# Patient Record
Sex: FEMALE | Race: WHITE | NOT HISPANIC OR LATINO | Employment: STUDENT | URBAN - METROPOLITAN AREA
[De-identification: names, ages, dates, MRNs, and addresses within clinical notes are randomized per-mention and may not be internally consistent; named-entity substitution may affect disease eponyms.]

---

## 2023-11-19 PROBLEM — N39.0 UTI (URINARY TRACT INFECTION): Status: ACTIVE | Noted: 2023-11-19

## 2023-11-19 PROBLEM — R10.2 PELVIC PAIN IN FEMALE: Status: ACTIVE | Noted: 2023-11-19

## 2023-11-20 ENCOUNTER — PROCEDURE VISIT (OUTPATIENT)
Dept: OBSTETRICS AND GYNECOLOGY | Facility: CLINIC | Age: 23
End: 2023-11-20
Payer: COMMERCIAL

## 2023-11-20 VITALS
DIASTOLIC BLOOD PRESSURE: 64 MMHG | SYSTOLIC BLOOD PRESSURE: 110 MMHG | BODY MASS INDEX: 29.99 KG/M2 | WEIGHT: 180 LBS | HEIGHT: 65 IN

## 2023-11-20 DIAGNOSIS — Z30.46 ENCOUNTER FOR NEXPLANON REMOVAL: ICD-10-CM

## 2023-11-20 PROCEDURE — 11982 REMOVE DRUG IMPLANT DEVICE: CPT | Performed by: OBSTETRICS & GYNECOLOGY

## 2023-11-20 NOTE — PROGRESS NOTES
Nexplanon Procedure: Nexplanon removal  Does not like amenorrhea associated with nexplanon. Will use condoms for now.  The implant was located by ultrasound guidance and a 2-3 mm incision was made in the longitudinal direction of the arm at the tip of the implant closest to the elbow. The implant was then grasped and removed with forceps.  Good hemostasis  Pt tolerated well    Enc condoms

## 2024-01-24 ENCOUNTER — TRANSCRIBE ORDERS (OUTPATIENT)
Dept: RADIOLOGY | Facility: CLINIC | Age: 24
End: 2024-01-24
Payer: COMMERCIAL

## 2024-01-24 DIAGNOSIS — N91.2 AMENORRHEA: ICD-10-CM

## 2024-02-19 ENCOUNTER — APPOINTMENT (OUTPATIENT)
Dept: RADIOLOGY | Facility: CLINIC | Age: 24
End: 2024-02-19
Payer: COMMERCIAL

## 2024-02-19 ENCOUNTER — APPOINTMENT (OUTPATIENT)
Dept: OBSTETRICS AND GYNECOLOGY | Facility: CLINIC | Age: 24
End: 2024-02-19
Payer: COMMERCIAL

## 2024-02-29 ENCOUNTER — HOSPITAL ENCOUNTER (OUTPATIENT)
Dept: RADIOLOGY | Facility: CLINIC | Age: 24
Discharge: HOME | End: 2024-02-29
Payer: COMMERCIAL

## 2024-02-29 ENCOUNTER — INITIAL PRENATAL (OUTPATIENT)
Dept: OBSTETRICS AND GYNECOLOGY | Facility: CLINIC | Age: 24
End: 2024-02-29
Payer: COMMERCIAL

## 2024-02-29 VITALS — BODY MASS INDEX: 31.78 KG/M2 | WEIGHT: 191 LBS | DIASTOLIC BLOOD PRESSURE: 70 MMHG | SYSTOLIC BLOOD PRESSURE: 118 MMHG

## 2024-02-29 DIAGNOSIS — N91.2 AMENORRHEA: ICD-10-CM

## 2024-02-29 DIAGNOSIS — Z34.01: ICD-10-CM

## 2024-02-29 DIAGNOSIS — Z3A.00 WEEKS OF GESTATION OF PREGNANCY NOT SPECIFIED (HHS-HCC): ICD-10-CM

## 2024-02-29 LAB
ERYTHROCYTE [DISTWIDTH] IN BLOOD BY AUTOMATED COUNT: 11.8 % (ref 11.5–14.5)
HBV SURFACE AG SERPL QL IA: NONREACTIVE
HCT VFR BLD AUTO: 39.8 % (ref 36–46)
HCV AB SER QL: NONREACTIVE
HGB BLD-MCNC: 13.7 G/DL (ref 12–16)
HIV 1+2 AB+HIV1 P24 AG SERPL QL IA: NONREACTIVE
MCH RBC QN AUTO: 29.1 PG (ref 26–34)
MCHC RBC AUTO-ENTMCNC: 34.4 G/DL (ref 32–36)
MCV RBC AUTO: 85 FL (ref 80–100)
NRBC BLD-RTO: 0 /100 WBCS (ref 0–0)
PLATELET # BLD AUTO: 264 X10*3/UL (ref 150–450)
PREGNANCY TEST URINE, POC: POSITIVE
RBC # BLD AUTO: 4.71 X10*6/UL (ref 4–5.2)
TREPONEMA PALLIDUM IGG+IGM AB [PRESENCE] IN SERUM OR PLASMA BY IMMUNOASSAY: NONREACTIVE
WBC # BLD AUTO: 6.2 X10*3/UL (ref 4.4–11.3)

## 2024-02-29 PROCEDURE — 87624 HPV HI-RISK TYP POOLED RSLT: CPT

## 2024-02-29 PROCEDURE — 0500F INITIAL PRENATAL CARE VISIT: CPT | Performed by: OBSTETRICS & GYNECOLOGY

## 2024-02-29 PROCEDURE — 76801 OB US < 14 WKS SINGLE FETUS: CPT | Performed by: OBSTETRICS & GYNECOLOGY

## 2024-02-29 PROCEDURE — 81025 URINE PREGNANCY TEST: CPT | Performed by: OBSTETRICS & GYNECOLOGY

## 2024-02-29 PROCEDURE — 86901 BLOOD TYPING SEROLOGIC RH(D): CPT

## 2024-02-29 PROCEDURE — 36415 COLL VENOUS BLD VENIPUNCTURE: CPT

## 2024-02-29 PROCEDURE — 86900 BLOOD TYPING SEROLOGIC ABO: CPT

## 2024-02-29 PROCEDURE — 86317 IMMUNOASSAY INFECTIOUS AGENT: CPT

## 2024-02-29 PROCEDURE — 86850 RBC ANTIBODY SCREEN: CPT

## 2024-02-29 PROCEDURE — 87389 HIV-1 AG W/HIV-1&-2 AB AG IA: CPT

## 2024-02-29 PROCEDURE — 87800 DETECT AGNT MULT DNA DIREC: CPT

## 2024-02-29 PROCEDURE — 85027 COMPLETE CBC AUTOMATED: CPT

## 2024-02-29 PROCEDURE — 87086 URINE CULTURE/COLONY COUNT: CPT

## 2024-02-29 PROCEDURE — 88175 CYTOPATH C/V AUTO FLUID REDO: CPT

## 2024-02-29 PROCEDURE — 87340 HEPATITIS B SURFACE AG IA: CPT

## 2024-02-29 PROCEDURE — 86803 HEPATITIS C AB TEST: CPT

## 2024-02-29 PROCEDURE — 88141 CYTOPATH C/V INTERPRET: CPT | Performed by: PATHOLOGY

## 2024-02-29 PROCEDURE — 86780 TREPONEMA PALLIDUM: CPT

## 2024-02-29 NOTE — PROGRESS NOTES
Subjective   Patient ID [id number]   Emy Renae is a 23 y.o.  at Unknown with a working estimated date of delivery of Not found. who presents for an initial prenatal visit. This pregnancy is unplanned.    Her pregnancy is complicated by:  obesity    OB History    Para Term  AB Living   2 0 0 0 1     SAB IAB Ectopic Multiple Live Births   0 1 0          # Outcome Date GA Lbr Wade/2nd Weight Sex Delivery Anes PTL Lv   2 Current            1 IAB                   Objective   Physical Exam  Weight: 86.6 kg (191 lb)  Expected Total Weight Gain: Could not be calculated   Pregravid BMI: Could not be calculated  BP: 118/70          OBGyn Exam    Prenatal Labs  ordered    Assessment/Plan   Immunizations:   Prenatal Labs ordered  Daily prenatal vitamins prescribed  First trimester screening and second trimester screening discussed. Patient decided to order NIPT today  Follow up in 4 weeks for return OB visit.

## 2024-03-01 LAB
ABO GROUP (TYPE) IN BLOOD: NORMAL
ANTIBODY SCREEN: NORMAL
BACTERIA UR CULT: NORMAL
C TRACH RRNA SPEC QL NAA+PROBE: NEGATIVE
N GONORRHOEA DNA SPEC QL PROBE+SIG AMP: NEGATIVE
RH FACTOR (ANTIGEN D): NORMAL
RUBV IGG SERPL IA-ACNC: 2.5 IA
RUBV IGG SERPL QL IA: POSITIVE

## 2024-03-18 LAB
CYTOLOGY CMNT CVX/VAG CYTO-IMP: NORMAL
HPV HR 12 DNA GENITAL QL NAA+PROBE: POSITIVE
HPV HR GENOTYPES PNL CVX NAA+PROBE: POSITIVE
HPV16 DNA SPEC QL NAA+PROBE: NEGATIVE
HPV18 DNA SPEC QL NAA+PROBE: NEGATIVE
LAB AP HPV GENOTYPE QUESTION: YES
LAB AP HPV HR: NORMAL
LABORATORY COMMENT REPORT: NORMAL
LMP START DATE: NORMAL
PATH REPORT.TOTAL CANCER: NORMAL

## 2024-04-02 ENCOUNTER — ROUTINE PRENATAL (OUTPATIENT)
Dept: OBSTETRICS AND GYNECOLOGY | Facility: CLINIC | Age: 24
End: 2024-04-02
Payer: COMMERCIAL

## 2024-04-02 VITALS — DIASTOLIC BLOOD PRESSURE: 74 MMHG | BODY MASS INDEX: 32.45 KG/M2 | WEIGHT: 195 LBS | SYSTOLIC BLOOD PRESSURE: 120 MMHG

## 2024-04-02 DIAGNOSIS — Z34.02 ENCOUNTER FOR SUPERVISION OF NORMAL FIRST PREGNANCY IN SECOND TRIMESTER (HHS-HCC): ICD-10-CM

## 2024-04-02 PROCEDURE — 0501F PRENATAL FLOW SHEET: CPT | Performed by: OBSTETRICS & GYNECOLOGY

## 2024-04-29 ENCOUNTER — HOSPITAL ENCOUNTER (OUTPATIENT)
Dept: RADIOLOGY | Facility: CLINIC | Age: 24
Discharge: HOME | End: 2024-04-29
Payer: COMMERCIAL

## 2024-04-29 ENCOUNTER — ROUTINE PRENATAL (OUTPATIENT)
Dept: OBSTETRICS AND GYNECOLOGY | Facility: CLINIC | Age: 24
End: 2024-04-29
Payer: COMMERCIAL

## 2024-04-29 VITALS — DIASTOLIC BLOOD PRESSURE: 70 MMHG | BODY MASS INDEX: 32.78 KG/M2 | WEIGHT: 197 LBS | SYSTOLIC BLOOD PRESSURE: 126 MMHG

## 2024-04-29 DIAGNOSIS — Z34.02 ENCOUNTER FOR SUPERVISION OF NORMAL FIRST PREGNANCY IN SECOND TRIMESTER (HHS-HCC): ICD-10-CM

## 2024-04-29 PROCEDURE — 76805 OB US >/= 14 WKS SNGL FETUS: CPT | Performed by: OBSTETRICS & GYNECOLOGY

## 2024-04-29 PROCEDURE — 0501F PRENATAL FLOW SHEET: CPT | Performed by: OBSTETRICS & GYNECOLOGY

## 2024-04-29 NOTE — PROGRESS NOTES
Anatomy scan normal  Patient complains of episodes of fast heart rate which resolves spontaneously  Has occurred for the last couple days  Sometimes feels lightheaded  Will continue to monitor for the next week or 2 if persistent will have patient follow-up with cardiology

## 2024-05-30 ENCOUNTER — APPOINTMENT (OUTPATIENT)
Dept: OBSTETRICS AND GYNECOLOGY | Facility: CLINIC | Age: 24
End: 2024-05-30
Payer: COMMERCIAL

## 2024-06-06 ENCOUNTER — ROUTINE PRENATAL (OUTPATIENT)
Dept: OBSTETRICS AND GYNECOLOGY | Facility: CLINIC | Age: 24
End: 2024-06-06
Payer: COMMERCIAL

## 2024-06-06 VITALS — BODY MASS INDEX: 32.78 KG/M2 | WEIGHT: 197 LBS | SYSTOLIC BLOOD PRESSURE: 128 MMHG | DIASTOLIC BLOOD PRESSURE: 72 MMHG

## 2024-06-06 DIAGNOSIS — O26.12 LOW WEIGHT GAIN DURING PREGNANCY IN SECOND TRIMESTER (HHS-HCC): Primary | ICD-10-CM

## 2024-06-06 DIAGNOSIS — Z34.02 ENCOUNTER FOR SUPERVISION OF NORMAL FIRST PREGNANCY IN SECOND TRIMESTER (HHS-HCC): ICD-10-CM

## 2024-06-06 PROCEDURE — 0501F PRENATAL FLOW SHEET: CPT | Performed by: OBSTETRICS & GYNECOLOGY

## 2024-07-05 ENCOUNTER — APPOINTMENT (OUTPATIENT)
Dept: OBSTETRICS AND GYNECOLOGY | Facility: CLINIC | Age: 24
End: 2024-07-05
Payer: COMMERCIAL

## 2024-07-07 RX ORDER — ALBUTEROL SULFATE 90 UG/1
2 AEROSOL, METERED RESPIRATORY (INHALATION) EVERY 6 HOURS PRN
COMMUNITY
Start: 2022-10-21

## 2024-07-08 ENCOUNTER — APPOINTMENT (OUTPATIENT)
Dept: RADIOLOGY | Facility: CLINIC | Age: 24
End: 2024-07-08
Payer: COMMERCIAL

## 2024-07-08 ENCOUNTER — APPOINTMENT (OUTPATIENT)
Dept: OBSTETRICS AND GYNECOLOGY | Facility: CLINIC | Age: 24
End: 2024-07-08
Payer: COMMERCIAL

## 2024-07-08 VITALS — SYSTOLIC BLOOD PRESSURE: 122 MMHG | DIASTOLIC BLOOD PRESSURE: 76 MMHG | WEIGHT: 200 LBS | BODY MASS INDEX: 33.28 KG/M2

## 2024-07-08 DIAGNOSIS — O26.12 LOW WEIGHT GAIN DURING PREGNANCY IN SECOND TRIMESTER (HHS-HCC): ICD-10-CM

## 2024-07-08 DIAGNOSIS — Z34.02 ENCOUNTER FOR SUPERVISION OF NORMAL FIRST PREGNANCY IN SECOND TRIMESTER (HHS-HCC): ICD-10-CM

## 2024-07-08 DIAGNOSIS — Z34.03 FIRST PREGNANCY, THIRD TRIMESTER (HHS-HCC): ICD-10-CM

## 2024-07-08 LAB
GLUCOSE 1H P 50 G GLC PO SERPL-MCNC: 124 MG/DL
HCT VFR BLD AUTO: 37.2 % (ref 36–46)
HGB BLD-MCNC: 12.6 G/DL (ref 12–16)

## 2024-07-08 PROCEDURE — 0501F PRENATAL FLOW SHEET: CPT | Performed by: OBSTETRICS & GYNECOLOGY

## 2024-07-08 PROCEDURE — 85014 HEMATOCRIT: CPT

## 2024-07-08 PROCEDURE — 36415 COLL VENOUS BLD VENIPUNCTURE: CPT

## 2024-07-08 PROCEDURE — 76815 OB US LIMITED FETUS(S): CPT | Performed by: OBSTETRICS & GYNECOLOGY

## 2024-07-08 PROCEDURE — 85018 HEMOGLOBIN: CPT

## 2024-07-08 PROCEDURE — 82947 ASSAY GLUCOSE BLOOD QUANT: CPT

## 2024-07-30 ENCOUNTER — APPOINTMENT (OUTPATIENT)
Dept: OBSTETRICS AND GYNECOLOGY | Facility: CLINIC | Age: 24
End: 2024-07-30
Payer: COMMERCIAL

## 2024-07-30 VITALS — SYSTOLIC BLOOD PRESSURE: 126 MMHG | WEIGHT: 205 LBS | DIASTOLIC BLOOD PRESSURE: 76 MMHG | BODY MASS INDEX: 34.11 KG/M2

## 2024-07-30 DIAGNOSIS — Z34.03 FIRST PREGNANCY, THIRD TRIMESTER (HHS-HCC): ICD-10-CM

## 2024-07-30 PROCEDURE — 90471 IMMUNIZATION ADMIN: CPT | Performed by: OBSTETRICS & GYNECOLOGY

## 2024-07-30 PROCEDURE — 0501F PRENATAL FLOW SHEET: CPT | Performed by: OBSTETRICS & GYNECOLOGY

## 2024-07-30 PROCEDURE — 90715 TDAP VACCINE 7 YRS/> IM: CPT | Performed by: OBSTETRICS & GYNECOLOGY

## 2024-08-13 ENCOUNTER — APPOINTMENT (OUTPATIENT)
Dept: OBSTETRICS AND GYNECOLOGY | Facility: CLINIC | Age: 24
End: 2024-08-13
Payer: COMMERCIAL

## 2024-08-15 ENCOUNTER — ROUTINE PRENATAL (OUTPATIENT)
Dept: OBSTETRICS AND GYNECOLOGY | Facility: CLINIC | Age: 24
End: 2024-08-15
Payer: COMMERCIAL

## 2024-08-15 VITALS — BODY MASS INDEX: 35.94 KG/M2 | SYSTOLIC BLOOD PRESSURE: 130 MMHG | WEIGHT: 216 LBS | DIASTOLIC BLOOD PRESSURE: 80 MMHG

## 2024-08-15 DIAGNOSIS — Z34.03 FIRST PREGNANCY, THIRD TRIMESTER (HHS-HCC): Primary | ICD-10-CM

## 2024-08-15 DIAGNOSIS — O99.213 OBESITY AFFECTING PREGNANCY IN THIRD TRIMESTER, UNSPECIFIED OBESITY TYPE (HHS-HCC): ICD-10-CM

## 2024-08-15 PROCEDURE — 0501F PRENATAL FLOW SHEET: CPT | Performed by: OBSTETRICS & GYNECOLOGY

## 2024-08-15 NOTE — PROGRESS NOTES
11# weight gain in 2 weeks  No significant swelling  BP slightly higher but still normal range  Precautions given  Follow up one week with OB US

## 2024-08-22 ENCOUNTER — APPOINTMENT (OUTPATIENT)
Dept: RADIOLOGY | Facility: CLINIC | Age: 24
End: 2024-08-22
Payer: COMMERCIAL

## 2024-08-22 ENCOUNTER — APPOINTMENT (OUTPATIENT)
Dept: OBSTETRICS AND GYNECOLOGY | Facility: CLINIC | Age: 24
End: 2024-08-22
Payer: COMMERCIAL

## 2024-08-22 VITALS — DIASTOLIC BLOOD PRESSURE: 84 MMHG | SYSTOLIC BLOOD PRESSURE: 132 MMHG | BODY MASS INDEX: 36.11 KG/M2 | WEIGHT: 217 LBS

## 2024-08-22 DIAGNOSIS — O99.213 OBESITY AFFECTING PREGNANCY IN THIRD TRIMESTER, UNSPECIFIED OBESITY TYPE (HHS-HCC): ICD-10-CM

## 2024-08-22 DIAGNOSIS — Z34.03 FIRST PREGNANCY, THIRD TRIMESTER (HHS-HCC): ICD-10-CM

## 2024-08-22 DIAGNOSIS — Z34.03 PREGNANCY, FIRST, THIRD TRIMESTER (HHS-HCC): ICD-10-CM

## 2024-08-22 PROCEDURE — 87081 CULTURE SCREEN ONLY: CPT

## 2024-08-22 PROCEDURE — 0501F PRENATAL FLOW SHEET: CPT | Performed by: OBSTETRICS & GYNECOLOGY

## 2024-08-24 LAB — GP B STREP GENITAL QL CULT: NORMAL

## 2024-08-29 ENCOUNTER — APPOINTMENT (OUTPATIENT)
Dept: OBSTETRICS AND GYNECOLOGY | Facility: CLINIC | Age: 24
End: 2024-08-29
Payer: COMMERCIAL

## 2024-08-29 VITALS — WEIGHT: 219 LBS | SYSTOLIC BLOOD PRESSURE: 128 MMHG | DIASTOLIC BLOOD PRESSURE: 84 MMHG | BODY MASS INDEX: 36.44 KG/M2

## 2024-08-29 DIAGNOSIS — Z34.03 PREGNANCY, FIRST, THIRD TRIMESTER (HHS-HCC): ICD-10-CM

## 2024-08-29 PROCEDURE — 0501F PRENATAL FLOW SHEET: CPT | Performed by: OBSTETRICS & GYNECOLOGY

## 2024-09-04 ENCOUNTER — TELEPHONE (OUTPATIENT)
Dept: OBSTETRICS AND GYNECOLOGY | Facility: CLINIC | Age: 24
End: 2024-09-04

## 2024-09-04 ENCOUNTER — ANESTHESIA (OUTPATIENT)
Dept: OBSTETRICS AND GYNECOLOGY | Facility: HOSPITAL | Age: 24
End: 2024-09-04
Payer: COMMERCIAL

## 2024-09-04 ENCOUNTER — HOSPITAL ENCOUNTER (INPATIENT)
Facility: HOSPITAL | Age: 24
LOS: 3 days | Discharge: HOME | End: 2024-09-07
Attending: OBSTETRICS & GYNECOLOGY | Admitting: OBSTETRICS & GYNECOLOGY
Payer: COMMERCIAL

## 2024-09-04 ENCOUNTER — ANESTHESIA EVENT (OUTPATIENT)
Dept: OBSTETRICS AND GYNECOLOGY | Facility: HOSPITAL | Age: 24
End: 2024-09-04
Payer: COMMERCIAL

## 2024-09-04 DIAGNOSIS — O13.9 GESTATIONAL HYPERTENSION, ANTEPARTUM (HHS-HCC): ICD-10-CM

## 2024-09-04 DIAGNOSIS — Z98.891 STATUS POST CESAREAN SECTION: Primary | ICD-10-CM

## 2024-09-04 PROBLEM — O42.90 LEAKAGE OF AMNIOTIC FLUID (HHS-HCC): Status: ACTIVE | Noted: 2024-09-04

## 2024-09-04 PROBLEM — Z3A.37 PREGNANCY WITH 37 WEEKS COMPLETED GESTATION (HHS-HCC): Status: ACTIVE | Noted: 2024-09-04

## 2024-09-04 LAB
ABO GROUP (TYPE) IN BLOOD: NORMAL
ALBUMIN SERPL BCP-MCNC: 3 G/DL (ref 3.4–5)
ALP SERPL-CCNC: 104 U/L (ref 33–110)
ALT SERPL W P-5'-P-CCNC: 9 U/L (ref 7–45)
ANION GAP SERPL CALC-SCNC: 13 MMOL/L (ref 10–20)
ANTIBODY SCREEN: NORMAL
AST SERPL W P-5'-P-CCNC: 11 U/L (ref 9–39)
BASE EXCESS BLDCOV CALC-SCNC: -5.9 MMOL/L (ref -8.1–-0.5)
BILIRUB SERPL-MCNC: 0.3 MG/DL (ref 0–1.2)
BODY TEMPERATURE: 37 DEGREES CELSIUS
BUN SERPL-MCNC: 8 MG/DL (ref 6–23)
CALCIUM SERPL-MCNC: 8.1 MG/DL (ref 8.6–10.3)
CHLORIDE SERPL-SCNC: 107 MMOL/L (ref 98–107)
CO2 SERPL-SCNC: 20 MMOL/L (ref 21–32)
CREAT SERPL-MCNC: 0.61 MG/DL (ref 0.5–1.05)
EGFRCR SERPLBLD CKD-EPI 2021: >90 ML/MIN/1.73M*2
ERYTHROCYTE [DISTWIDTH] IN BLOOD BY AUTOMATED COUNT: 13 % (ref 11.5–14.5)
GLUCOSE SERPL-MCNC: 67 MG/DL (ref 74–99)
HCO3 BLDCOV-SCNC: 20.2 MMOL/L (ref 16–26)
HCT VFR BLD AUTO: 35.8 % (ref 36–46)
HGB BLD-MCNC: 12.3 G/DL (ref 12–16)
INHALED O2 CONCENTRATION: 21 %
LDH SERPL L TO P-CCNC: 155 U/L (ref 84–246)
MCH RBC QN AUTO: 29.3 PG (ref 26–34)
MCHC RBC AUTO-ENTMCNC: 34.4 G/DL (ref 32–36)
MCV RBC AUTO: 85 FL (ref 80–100)
NRBC BLD-RTO: 0 /100 WBCS (ref 0–0)
OXYHGB MFR BLDCOV: 55.8 % (ref 94–98)
PCO2 BLDCOV: 41 MM HG (ref 22–53)
PH BLDCOV: 7.3 PH (ref 7.19–7.47)
PLATELET # BLD AUTO: 170 X10*3/UL (ref 150–450)
PO2 BLDCOV: 29 MM HG (ref 13–37)
POTASSIUM SERPL-SCNC: 4 MMOL/L (ref 3.5–5.3)
PROT SERPL-MCNC: 5.3 G/DL (ref 6.4–8.2)
RBC # BLD AUTO: 4.2 X10*6/UL (ref 4–5.2)
RH FACTOR (ANTIGEN D): NORMAL
SAO2 % BLDCOV: 57 % (ref 16–84)
SODIUM SERPL-SCNC: 136 MMOL/L (ref 136–145)
TEST COMMENT: ABNORMAL
TREPONEMA PALLIDUM IGG+IGM AB [PRESENCE] IN SERUM OR PLASMA BY IMMUNOASSAY: NONREACTIVE
WBC # BLD AUTO: 12.4 X10*3/UL (ref 4.4–11.3)

## 2024-09-04 PROCEDURE — 7100000016 HC LABOR RECOVERY PER HOUR

## 2024-09-04 PROCEDURE — 83615 LACTATE (LD) (LDH) ENZYME: CPT | Performed by: OBSTETRICS & GYNECOLOGY

## 2024-09-04 PROCEDURE — 3700000014 HC AN EPIDURAL BLOCK CHARGE: Performed by: OBSTETRICS & GYNECOLOGY

## 2024-09-04 PROCEDURE — 7210000002 HC LABOR PER HOUR

## 2024-09-04 PROCEDURE — 2500000004 HC RX 250 GENERAL PHARMACY W/ HCPCS (ALT 636 FOR OP/ED): Mod: JZ | Performed by: OBSTETRICS & GYNECOLOGY

## 2024-09-04 PROCEDURE — 59050 FETAL MONITOR W/REPORT: CPT

## 2024-09-04 PROCEDURE — 2500000004 HC RX 250 GENERAL PHARMACY W/ HCPCS (ALT 636 FOR OP/ED): Performed by: ADVANCED PRACTICE MIDWIFE

## 2024-09-04 PROCEDURE — 86780 TREPONEMA PALLIDUM: CPT | Mod: AHULAB | Performed by: OBSTETRICS & GYNECOLOGY

## 2024-09-04 PROCEDURE — 82805 BLOOD GASES W/O2 SATURATION: CPT | Performed by: OBSTETRICS & GYNECOLOGY

## 2024-09-04 PROCEDURE — 88307 TISSUE EXAM BY PATHOLOGIST: CPT | Mod: TC,AHULAB | Performed by: OBSTETRICS & GYNECOLOGY

## 2024-09-04 PROCEDURE — 59515 CESAREAN DELIVERY: CPT | Performed by: OBSTETRICS & GYNECOLOGY

## 2024-09-04 PROCEDURE — 36415 COLL VENOUS BLD VENIPUNCTURE: CPT | Performed by: OBSTETRICS & GYNECOLOGY

## 2024-09-04 PROCEDURE — 88307 TISSUE EXAM BY PATHOLOGIST: CPT | Performed by: PATHOLOGY

## 2024-09-04 PROCEDURE — 10H07YZ INSERTION OF OTHER DEVICE INTO PRODUCTS OF CONCEPTION, VIA NATURAL OR ARTIFICIAL OPENING: ICD-10-PCS | Performed by: OBSTETRICS & GYNECOLOGY

## 2024-09-04 PROCEDURE — 2500000004 HC RX 250 GENERAL PHARMACY W/ HCPCS (ALT 636 FOR OP/ED): Performed by: OBSTETRICS & GYNECOLOGY

## 2024-09-04 PROCEDURE — 59514 CESAREAN DELIVERY ONLY: CPT | Performed by: OBSTETRICS & GYNECOLOGY

## 2024-09-04 PROCEDURE — 1120000001 HC OB PRIVATE ROOM DAILY

## 2024-09-04 PROCEDURE — 86901 BLOOD TYPING SEROLOGIC RH(D): CPT | Performed by: OBSTETRICS & GYNECOLOGY

## 2024-09-04 PROCEDURE — 2500000004 HC RX 250 GENERAL PHARMACY W/ HCPCS (ALT 636 FOR OP/ED): Performed by: NURSE ANESTHETIST, CERTIFIED REGISTERED

## 2024-09-04 PROCEDURE — 2720000007 HC OR 272 NO HCPCS: Performed by: OBSTETRICS & GYNECOLOGY

## 2024-09-04 PROCEDURE — 80053 COMPREHEN METABOLIC PANEL: CPT | Performed by: OBSTETRICS & GYNECOLOGY

## 2024-09-04 PROCEDURE — 2500000001 HC RX 250 WO HCPCS SELF ADMINISTERED DRUGS (ALT 637 FOR MEDICARE OP): Performed by: NURSE ANESTHETIST, CERTIFIED REGISTERED

## 2024-09-04 PROCEDURE — 85027 COMPLETE CBC AUTOMATED: CPT | Performed by: OBSTETRICS & GYNECOLOGY

## 2024-09-04 PROCEDURE — A6213 FOAM DRG >16<=48 SQ IN W/BDR: HCPCS | Performed by: OBSTETRICS & GYNECOLOGY

## 2024-09-04 PROCEDURE — 7100000016 HC LABOR RECOVERY PER HOUR: Performed by: OBSTETRICS & GYNECOLOGY

## 2024-09-04 RX ORDER — METOCLOPRAMIDE HYDROCHLORIDE 5 MG/ML
10 INJECTION INTRAMUSCULAR; INTRAVENOUS ONCE
Status: DISCONTINUED | OUTPATIENT
Start: 2024-09-04 | End: 2024-09-04

## 2024-09-04 RX ORDER — ONDANSETRON 4 MG/1
4 TABLET, FILM COATED ORAL EVERY 6 HOURS PRN
Status: DISCONTINUED | OUTPATIENT
Start: 2024-09-04 | End: 2024-09-04

## 2024-09-04 RX ORDER — METOCLOPRAMIDE HYDROCHLORIDE 5 MG/ML
10 INJECTION INTRAMUSCULAR; INTRAVENOUS EVERY 6 HOURS PRN
Status: DISCONTINUED | OUTPATIENT
Start: 2024-09-04 | End: 2024-09-04

## 2024-09-04 RX ORDER — METHYLERGONOVINE MALEATE 0.2 MG/ML
0.2 INJECTION INTRAVENOUS ONCE AS NEEDED
Status: DISCONTINUED | OUTPATIENT
Start: 2024-09-04 | End: 2024-09-04

## 2024-09-04 RX ORDER — MISOPROSTOL 200 UG/1
800 TABLET ORAL ONCE AS NEEDED
Status: DISCONTINUED | OUTPATIENT
Start: 2024-09-04 | End: 2024-09-07 | Stop reason: HOSPADM

## 2024-09-04 RX ORDER — SODIUM CITRATE AND CITRIC ACID MONOHYDRATE 334; 500 MG/5ML; MG/5ML
30 SOLUTION ORAL ONCE
Status: DISCONTINUED | OUTPATIENT
Start: 2024-09-04 | End: 2024-09-04

## 2024-09-04 RX ORDER — DIPHENHYDRAMINE HCL 25 MG
25 CAPSULE ORAL EVERY 4 HOURS PRN
Status: DISCONTINUED | OUTPATIENT
Start: 2024-09-04 | End: 2024-09-07 | Stop reason: HOSPADM

## 2024-09-04 RX ORDER — PHENYLEPHRINE 10 MG/250 ML(40 MCG/ML)IN 0.9 % SOD.CHLORIDE INTRAVENOUS
CONTINUOUS PRN
Status: DISCONTINUED | OUTPATIENT
Start: 2024-09-04 | End: 2024-09-04

## 2024-09-04 RX ORDER — ONDANSETRON HYDROCHLORIDE 2 MG/ML
4 INJECTION, SOLUTION INTRAVENOUS EVERY 6 HOURS PRN
Status: DISCONTINUED | OUTPATIENT
Start: 2024-09-04 | End: 2024-09-07 | Stop reason: HOSPADM

## 2024-09-04 RX ORDER — SODIUM CHLORIDE, SODIUM LACTATE, POTASSIUM CHLORIDE, CALCIUM CHLORIDE 600; 310; 30; 20 MG/100ML; MG/100ML; MG/100ML; MG/100ML
125 INJECTION, SOLUTION INTRAVENOUS CONTINUOUS
Status: DISCONTINUED | OUTPATIENT
Start: 2024-09-04 | End: 2024-09-04

## 2024-09-04 RX ORDER — LOPERAMIDE HYDROCHLORIDE 2 MG/1
4 CAPSULE ORAL EVERY 2 HOUR PRN
Status: DISCONTINUED | OUTPATIENT
Start: 2024-09-04 | End: 2024-09-04

## 2024-09-04 RX ORDER — OXYCODONE HYDROCHLORIDE 5 MG/1
10 TABLET ORAL EVERY 4 HOURS PRN
Status: DISCONTINUED | OUTPATIENT
Start: 2024-09-05 | End: 2024-09-07 | Stop reason: HOSPADM

## 2024-09-04 RX ORDER — HYDRALAZINE HYDROCHLORIDE 20 MG/ML
5 INJECTION INTRAMUSCULAR; INTRAVENOUS ONCE AS NEEDED
Status: DISCONTINUED | OUTPATIENT
Start: 2024-09-04 | End: 2024-09-04

## 2024-09-04 RX ORDER — TRANEXAMIC ACID 10 MG/ML
1000 INJECTION, SOLUTION INTRAVENOUS ONCE AS NEEDED
Status: DISPENSED | OUTPATIENT
Start: 2024-09-04 | End: 2024-09-06

## 2024-09-04 RX ORDER — SODIUM CHLORIDE, SODIUM LACTATE, POTASSIUM CHLORIDE, CALCIUM CHLORIDE 600; 310; 30; 20 MG/100ML; MG/100ML; MG/100ML; MG/100ML
20 INJECTION, SOLUTION INTRAVENOUS CONTINUOUS
Status: DISCONTINUED | OUTPATIENT
Start: 2024-09-04 | End: 2024-09-07 | Stop reason: HOSPADM

## 2024-09-04 RX ORDER — SCOLOPAMINE TRANSDERMAL SYSTEM 1 MG/1
1 PATCH, EXTENDED RELEASE TRANSDERMAL ONCE AS NEEDED
Status: DISCONTINUED | OUTPATIENT
Start: 2024-09-04 | End: 2024-09-04

## 2024-09-04 RX ORDER — METHYLERGONOVINE MALEATE 0.2 MG/ML
0.2 INJECTION INTRAVENOUS ONCE AS NEEDED
Status: DISCONTINUED | OUTPATIENT
Start: 2024-09-04 | End: 2024-09-07 | Stop reason: HOSPADM

## 2024-09-04 RX ORDER — ONDANSETRON HYDROCHLORIDE 2 MG/ML
INJECTION, SOLUTION INTRAVENOUS AS NEEDED
Status: DISCONTINUED | OUTPATIENT
Start: 2024-09-04 | End: 2024-09-04

## 2024-09-04 RX ORDER — LIDOCAINE HYDROCHLORIDE 10 MG/ML
30 INJECTION INFILTRATION; PERINEURAL ONCE AS NEEDED
Status: DISCONTINUED | OUTPATIENT
Start: 2024-09-04 | End: 2024-09-04

## 2024-09-04 RX ORDER — MISOPROSTOL 200 UG/1
800 TABLET ORAL ONCE AS NEEDED
Status: DISCONTINUED | OUTPATIENT
Start: 2024-09-04 | End: 2024-09-04

## 2024-09-04 RX ORDER — HYDROMORPHONE HYDROCHLORIDE 1 MG/ML
0.2 INJECTION, SOLUTION INTRAMUSCULAR; INTRAVENOUS; SUBCUTANEOUS EVERY 5 MIN PRN
Status: CANCELLED | OUTPATIENT
Start: 2024-09-04

## 2024-09-04 RX ORDER — OXYTOCIN/0.9 % SODIUM CHLORIDE 30/500 ML
60 PLASTIC BAG, INJECTION (ML) INTRAVENOUS ONCE AS NEEDED
Status: DISCONTINUED | OUTPATIENT
Start: 2024-09-04 | End: 2024-09-04

## 2024-09-04 RX ORDER — CARBOPROST TROMETHAMINE 250 UG/ML
250 INJECTION, SOLUTION INTRAMUSCULAR ONCE AS NEEDED
Status: DISCONTINUED | OUTPATIENT
Start: 2024-09-04 | End: 2024-09-07 | Stop reason: HOSPADM

## 2024-09-04 RX ORDER — OXYCODONE HYDROCHLORIDE 5 MG/1
5 TABLET ORAL EVERY 4 HOURS PRN
Status: DISCONTINUED | OUTPATIENT
Start: 2024-09-05 | End: 2024-09-07 | Stop reason: HOSPADM

## 2024-09-04 RX ORDER — IBUPROFEN 600 MG/1
600 TABLET ORAL EVERY 6 HOURS
Status: DISCONTINUED | OUTPATIENT
Start: 2024-09-05 | End: 2024-09-07 | Stop reason: HOSPADM

## 2024-09-04 RX ORDER — DIPHENHYDRAMINE HYDROCHLORIDE 50 MG/ML
25 INJECTION INTRAMUSCULAR; INTRAVENOUS EVERY 4 HOURS PRN
Status: DISCONTINUED | OUTPATIENT
Start: 2024-09-04 | End: 2024-09-07 | Stop reason: HOSPADM

## 2024-09-04 RX ORDER — ACETAMINOPHEN 650 MG/1
SUPPOSITORY RECTAL AS NEEDED
Status: DISCONTINUED | OUTPATIENT
Start: 2024-09-04 | End: 2024-09-04

## 2024-09-04 RX ORDER — MORPHINE SULFATE 0.5 MG/ML
INJECTION, SOLUTION EPIDURAL; INTRATHECAL; INTRAVENOUS AS NEEDED
Status: DISCONTINUED | OUTPATIENT
Start: 2024-09-04 | End: 2024-09-04

## 2024-09-04 RX ORDER — TERBUTALINE SULFATE 1 MG/ML
0.25 INJECTION SUBCUTANEOUS ONCE AS NEEDED
Status: DISCONTINUED | OUTPATIENT
Start: 2024-09-04 | End: 2024-09-04

## 2024-09-04 RX ORDER — TRANEXAMIC ACID 10 MG/ML
1000 INJECTION, SOLUTION INTRAVENOUS ONCE AS NEEDED
Status: DISCONTINUED | OUTPATIENT
Start: 2024-09-04 | End: 2024-09-04

## 2024-09-04 RX ORDER — ONDANSETRON 4 MG/1
4 TABLET, FILM COATED ORAL EVERY 6 HOURS PRN
Status: DISCONTINUED | OUTPATIENT
Start: 2024-09-04 | End: 2024-09-07 | Stop reason: HOSPADM

## 2024-09-04 RX ORDER — ACETAMINOPHEN 325 MG/1
975 TABLET ORAL EVERY 6 HOURS
Status: DISCONTINUED | OUTPATIENT
Start: 2024-09-05 | End: 2024-09-07 | Stop reason: HOSPADM

## 2024-09-04 RX ORDER — OXYTOCIN/0.9 % SODIUM CHLORIDE 30/500 ML
60 PLASTIC BAG, INJECTION (ML) INTRAVENOUS ONCE AS NEEDED
Status: DISCONTINUED | OUTPATIENT
Start: 2024-09-04 | End: 2024-09-07 | Stop reason: HOSPADM

## 2024-09-04 RX ORDER — NIFEDIPINE 10 MG/1
10 CAPSULE ORAL ONCE AS NEEDED
Status: DISCONTINUED | OUTPATIENT
Start: 2024-09-04 | End: 2024-09-07 | Stop reason: HOSPADM

## 2024-09-04 RX ORDER — ADHESIVE BANDAGE
10 BANDAGE TOPICAL
Status: DISCONTINUED | OUTPATIENT
Start: 2024-09-04 | End: 2024-09-07 | Stop reason: HOSPADM

## 2024-09-04 RX ORDER — NALOXONE HYDROCHLORIDE 0.4 MG/ML
0.1 INJECTION, SOLUTION INTRAMUSCULAR; INTRAVENOUS; SUBCUTANEOUS EVERY 5 MIN PRN
Status: DISCONTINUED | OUTPATIENT
Start: 2024-09-04 | End: 2024-09-07 | Stop reason: HOSPADM

## 2024-09-04 RX ORDER — HYDROMORPHONE HYDROCHLORIDE 1 MG/ML
0.2 INJECTION, SOLUTION INTRAMUSCULAR; INTRAVENOUS; SUBCUTANEOUS EVERY 5 MIN PRN
Status: DISCONTINUED | OUTPATIENT
Start: 2024-09-04 | End: 2024-09-07 | Stop reason: HOSPADM

## 2024-09-04 RX ORDER — POLYETHYLENE GLYCOL 3350 17 G/17G
17 POWDER, FOR SOLUTION ORAL 2 TIMES DAILY PRN
Status: DISCONTINUED | OUTPATIENT
Start: 2024-09-04 | End: 2024-09-07 | Stop reason: HOSPADM

## 2024-09-04 RX ORDER — METOCLOPRAMIDE 10 MG/1
10 TABLET ORAL EVERY 6 HOURS PRN
Status: DISCONTINUED | OUTPATIENT
Start: 2024-09-04 | End: 2024-09-04

## 2024-09-04 RX ORDER — LABETALOL HYDROCHLORIDE 5 MG/ML
20 INJECTION, SOLUTION INTRAVENOUS ONCE AS NEEDED
Status: DISCONTINUED | OUTPATIENT
Start: 2024-09-04 | End: 2024-09-04

## 2024-09-04 RX ORDER — HYDRALAZINE HYDROCHLORIDE 20 MG/ML
5 INJECTION INTRAMUSCULAR; INTRAVENOUS ONCE AS NEEDED
Status: DISCONTINUED | OUTPATIENT
Start: 2024-09-04 | End: 2024-09-07 | Stop reason: HOSPADM

## 2024-09-04 RX ORDER — ONDANSETRON HYDROCHLORIDE 2 MG/ML
4 INJECTION, SOLUTION INTRAVENOUS EVERY 6 HOURS PRN
Status: DISCONTINUED | OUTPATIENT
Start: 2024-09-04 | End: 2024-09-04

## 2024-09-04 RX ORDER — NALBUPHINE HYDROCHLORIDE 10 MG/ML
10 INJECTION, SOLUTION INTRAMUSCULAR; INTRAVENOUS; SUBCUTANEOUS ONCE
Status: COMPLETED | OUTPATIENT
Start: 2024-09-04 | End: 2024-09-04

## 2024-09-04 RX ORDER — LOPERAMIDE HYDROCHLORIDE 2 MG/1
4 CAPSULE ORAL EVERY 2 HOUR PRN
Status: DISCONTINUED | OUTPATIENT
Start: 2024-09-04 | End: 2024-09-07 | Stop reason: HOSPADM

## 2024-09-04 RX ORDER — NIFEDIPINE 10 MG/1
10 CAPSULE ORAL ONCE AS NEEDED
Status: DISCONTINUED | OUTPATIENT
Start: 2024-09-04 | End: 2024-09-04

## 2024-09-04 RX ORDER — KETOROLAC TROMETHAMINE 30 MG/ML
INJECTION, SOLUTION INTRAMUSCULAR; INTRAVENOUS AS NEEDED
Status: DISCONTINUED | OUTPATIENT
Start: 2024-09-04 | End: 2024-09-04

## 2024-09-04 RX ORDER — BISACODYL 10 MG/1
10 SUPPOSITORY RECTAL DAILY PRN
Status: DISCONTINUED | OUTPATIENT
Start: 2024-09-04 | End: 2024-09-07 | Stop reason: HOSPADM

## 2024-09-04 RX ORDER — LABETALOL HYDROCHLORIDE 5 MG/ML
20 INJECTION, SOLUTION INTRAVENOUS ONCE AS NEEDED
Status: DISCONTINUED | OUTPATIENT
Start: 2024-09-04 | End: 2024-09-07 | Stop reason: HOSPADM

## 2024-09-04 RX ORDER — CEFAZOLIN SODIUM 2 G/100ML
2 INJECTION, SOLUTION INTRAVENOUS ONCE
Status: COMPLETED | OUTPATIENT
Start: 2024-09-04 | End: 2024-09-04

## 2024-09-04 RX ORDER — AMOXICILLIN 250 MG
2 CAPSULE ORAL NIGHTLY PRN
Status: DISCONTINUED | OUTPATIENT
Start: 2024-09-04 | End: 2024-09-07 | Stop reason: HOSPADM

## 2024-09-04 RX ORDER — ENOXAPARIN SODIUM 100 MG/ML
40 INJECTION SUBCUTANEOUS EVERY 24 HOURS
Status: DISCONTINUED | OUTPATIENT
Start: 2024-09-05 | End: 2024-09-07 | Stop reason: HOSPADM

## 2024-09-04 RX ORDER — OXYTOCIN 10 [USP'U]/ML
10 INJECTION, SOLUTION INTRAMUSCULAR; INTRAVENOUS ONCE AS NEEDED
Status: DISCONTINUED | OUTPATIENT
Start: 2024-09-04 | End: 2024-09-04

## 2024-09-04 RX ORDER — AZITHROMYCIN 100 MG/ML
INJECTION, POWDER, LYOPHILIZED, FOR SOLUTION INTRAVENOUS AS NEEDED
Status: DISCONTINUED | OUTPATIENT
Start: 2024-09-04 | End: 2024-09-04

## 2024-09-04 RX ORDER — NALBUPHINE HYDROCHLORIDE 10 MG/ML
10 INJECTION, SOLUTION INTRAMUSCULAR; INTRAVENOUS; SUBCUTANEOUS
Status: DISCONTINUED | OUTPATIENT
Start: 2024-09-04 | End: 2024-09-04

## 2024-09-04 RX ORDER — KETOROLAC TROMETHAMINE 30 MG/ML
30 INJECTION, SOLUTION INTRAMUSCULAR; INTRAVENOUS EVERY 6 HOURS
Status: COMPLETED | OUTPATIENT
Start: 2024-09-05 | End: 2024-09-05

## 2024-09-04 RX ORDER — SIMETHICONE 80 MG
80 TABLET,CHEWABLE ORAL 4 TIMES DAILY PRN
Status: DISCONTINUED | OUTPATIENT
Start: 2024-09-04 | End: 2024-09-07 | Stop reason: HOSPADM

## 2024-09-04 RX ORDER — LIDOCAINE 560 MG/1
1 PATCH PERCUTANEOUS; TOPICAL; TRANSDERMAL
Status: DISCONTINUED | OUTPATIENT
Start: 2024-09-04 | End: 2024-09-07 | Stop reason: HOSPADM

## 2024-09-04 RX ORDER — OXYTOCIN/0.9 % SODIUM CHLORIDE 30/500 ML
2-30 PLASTIC BAG, INJECTION (ML) INTRAVENOUS CONTINUOUS
Status: DISCONTINUED | OUTPATIENT
Start: 2024-09-04 | End: 2024-09-04

## 2024-09-04 RX ORDER — OXYTOCIN 10 [USP'U]/ML
10 INJECTION, SOLUTION INTRAMUSCULAR; INTRAVENOUS ONCE AS NEEDED
Status: DISCONTINUED | OUTPATIENT
Start: 2024-09-04 | End: 2024-09-07 | Stop reason: HOSPADM

## 2024-09-04 RX ORDER — ALBUTEROL SULFATE 90 UG/1
2 INHALANT RESPIRATORY (INHALATION) EVERY 6 HOURS PRN
Status: DISCONTINUED | OUTPATIENT
Start: 2024-09-04 | End: 2024-09-07 | Stop reason: HOSPADM

## 2024-09-04 RX ORDER — CARBOPROST TROMETHAMINE 250 UG/ML
250 INJECTION, SOLUTION INTRAMUSCULAR ONCE AS NEEDED
Status: DISCONTINUED | OUTPATIENT
Start: 2024-09-04 | End: 2024-09-04

## 2024-09-04 RX ORDER — FAMOTIDINE 10 MG/ML
20 INJECTION INTRAVENOUS ONCE
Status: DISCONTINUED | OUTPATIENT
Start: 2024-09-04 | End: 2024-09-04

## 2024-09-04 RX ORDER — BUPIVACAINE HYDROCHLORIDE 7.5 MG/ML
INJECTION, SOLUTION INTRASPINAL AS NEEDED
Status: DISCONTINUED | OUTPATIENT
Start: 2024-09-04 | End: 2024-09-04

## 2024-09-04 RX ADMIN — KETOROLAC TROMETHAMINE 30 MG: 30 INJECTION, SOLUTION INTRAMUSCULAR; INTRAVENOUS at 23:57

## 2024-09-04 RX ADMIN — ACETAMINOPHEN 975 MG: 325 TABLET ORAL at 23:57

## 2024-09-04 RX ADMIN — SODIUM CHLORIDE, POTASSIUM CHLORIDE, SODIUM LACTATE AND CALCIUM CHLORIDE 125 ML/HR: 600; 310; 30; 20 INJECTION, SOLUTION INTRAVENOUS at 13:28

## 2024-09-04 RX ADMIN — ONDANSETRON 4 MG: 2 INJECTION INTRAMUSCULAR; INTRAVENOUS at 20:32

## 2024-09-04 RX ADMIN — NALBUPHINE HYDROCHLORIDE 10 MG: 10 INJECTION, SOLUTION INTRAMUSCULAR; INTRAVENOUS; SUBCUTANEOUS at 13:27

## 2024-09-04 RX ADMIN — SCOPALAMINE 1 PATCH: 1 PATCH, EXTENDED RELEASE TRANSDERMAL at 20:28

## 2024-09-04 SDOH — HEALTH STABILITY: MENTAL HEALTH: WISH TO BE DEAD (PAST 1 MONTH): NO

## 2024-09-04 SDOH — ECONOMIC STABILITY: HOUSING INSECURITY: DO YOU FEEL UNSAFE GOING BACK TO THE PLACE WHERE YOU ARE LIVING?: NO

## 2024-09-04 SDOH — SOCIAL STABILITY: SOCIAL INSECURITY: DO YOU FEEL ANYONE HAS EXPLOITED OR TAKEN ADVANTAGE OF YOU FINANCIALLY OR OF YOUR PERSONAL PROPERTY?: NO

## 2024-09-04 SDOH — SOCIAL STABILITY: SOCIAL INSECURITY: ABUSE SCREEN: ADULT

## 2024-09-04 SDOH — SOCIAL STABILITY: SOCIAL INSECURITY: PHYSICAL ABUSE: DENIES

## 2024-09-04 SDOH — SOCIAL STABILITY: SOCIAL INSECURITY: ARE THERE ANY APPARENT SIGNS OF INJURIES/BEHAVIORS THAT COULD BE RELATED TO ABUSE/NEGLECT?: NO

## 2024-09-04 SDOH — SOCIAL STABILITY: SOCIAL INSECURITY: HAVE YOU HAD THOUGHTS OF HARMING ANYONE ELSE?: NO

## 2024-09-04 SDOH — HEALTH STABILITY: MENTAL HEALTH: HOW OFTEN DO YOU HAVE 6 OR MORE DRINKS ON ONE OCCASION?: NEVER

## 2024-09-04 SDOH — HEALTH STABILITY: MENTAL HEALTH: HOW OFTEN DO YOU HAVE A DRINK CONTAINING ALCOHOL?: NEVER

## 2024-09-04 SDOH — SOCIAL STABILITY: SOCIAL INSECURITY: ARE YOU OR HAVE YOU BEEN THREATENED OR ABUSED PHYSICALLY, EMOTIONALLY, OR SEXUALLY BY ANYONE?: NO

## 2024-09-04 SDOH — HEALTH STABILITY: MENTAL HEALTH: SUICIDAL BEHAVIOR (LIFETIME): NO

## 2024-09-04 SDOH — HEALTH STABILITY: MENTAL HEALTH: NON-SPECIFIC ACTIVE SUICIDAL THOUGHTS (PAST 1 MONTH): NO

## 2024-09-04 SDOH — HEALTH STABILITY: MENTAL HEALTH: WERE YOU ABLE TO COMPLETE ALL THE BEHAVIORAL HEALTH SCREENINGS?: YES

## 2024-09-04 SDOH — HEALTH STABILITY: MENTAL HEALTH: HOW MANY STANDARD DRINKS CONTAINING ALCOHOL DO YOU HAVE ON A TYPICAL DAY?: PATIENT DOES NOT DRINK

## 2024-09-04 SDOH — SOCIAL STABILITY: SOCIAL INSECURITY: HAS ANYONE EVER THREATENED TO HURT YOUR FAMILY OR YOUR PETS?: NO

## 2024-09-04 SDOH — SOCIAL STABILITY: SOCIAL INSECURITY: VERBAL ABUSE: DENIES

## 2024-09-04 SDOH — SOCIAL STABILITY: SOCIAL INSECURITY: DOES ANYONE TRY TO KEEP YOU FROM HAVING/CONTACTING OTHER FRIENDS OR DOING THINGS OUTSIDE YOUR HOME?: NO

## 2024-09-04 SDOH — HEALTH STABILITY: MENTAL HEALTH: CURRENT SMOKER: 0

## 2024-09-04 ASSESSMENT — LIFESTYLE VARIABLES
AUDIT-C TOTAL SCORE: 0
SKIP TO QUESTIONS 9-10: 1
HOW OFTEN DO YOU HAVE 6 OR MORE DRINKS ON ONE OCCASION: NEVER
HOW MANY STANDARD DRINKS CONTAINING ALCOHOL DO YOU HAVE ON A TYPICAL DAY: PATIENT DOES NOT DRINK
SKIP TO QUESTIONS 9-10: 1
AUDIT-C TOTAL SCORE: 0
HOW OFTEN DO YOU HAVE A DRINK CONTAINING ALCOHOL: NEVER
AUDIT-C TOTAL SCORE: 0

## 2024-09-04 ASSESSMENT — PAIN SCALES - GENERAL
PAINLEVEL_OUTOF10: 0 - NO PAIN
PAIN_LEVEL: 0
PAINLEVEL_OUTOF10: 2
PAINLEVEL_OUTOF10: 6
PAINLEVEL_OUTOF10: 0 - NO PAIN

## 2024-09-04 ASSESSMENT — PATIENT HEALTH QUESTIONNAIRE - PHQ9
SUM OF ALL RESPONSES TO PHQ9 QUESTIONS 1 & 2: 0
2. FEELING DOWN, DEPRESSED OR HOPELESS: NOT AT ALL
1. LITTLE INTEREST OR PLEASURE IN DOING THINGS: NOT AT ALL

## 2024-09-04 ASSESSMENT — ACTIVITIES OF DAILY LIVING (ADL): LACK_OF_TRANSPORTATION: NO

## 2024-09-04 NOTE — H&P
Note Type:  OB Admission H&P      ASSESSMENT & PLAN: Emy Renae is a 23 y.o.  at 37w3d who is admitted for Ruptured Membranes >/+= 34 weeks    Plan:    -Admit to L&D, consented,  cephalic based on: cervical exam and Leopold  -Labs drawn:  T&S, CBC, and Syphilis  -Epidural at patient request  -Recheck as clinically indicated by maternal or fetal status    Fetal Status  -NST reactive, reassuring     -EFW 2422 by sono on 2024  -BMZ No  -1hr 124  -GBS NEG  -NICU consult No    -Postpartum Contraception Plan:  unsure    Pregnancy Problems (from 24 to present)       Problem Noted Resolved    Pregnancy with 37 weeks completed gestation ([facility]) 2024 by Isabela Wise, DO No    Priority:  Medium      Leakage of amniotic fluid ([facility]) 2024 by Isabela Wise, DO No    Priority:  Medium              Subjective   Good fetal movement.  Denies vaginal bleeding., admits leaking fluid since 24    Prenatal Provider Nicola    OB History    Para Term  AB Living   2 0 0 0 1 0   SAB IAB Ectopic Multiple Live Births   0 1 0 0 0      # Outcome Date GA Lbr Wade/2nd Weight Sex Type Anes PTL Lv   2 Current            1 IAB                Past Surgical History:   Procedure Laterality Date    OTHER SURGICAL HISTORY  2022    Pilonidal cyst removal       Social History     Tobacco Use    Smoking status: Former     Types: Cigarettes    Smokeless tobacco: Never   Substance Use Topics    Alcohol use: Not Currently       No Known Allergies    Medications Prior to Admission   Medication Sig Dispense Refill Last Dose    albuterol 90 mcg/actuation inhaler Inhale 2 puffs every 6 hours if needed.        Objective     Last Vitals  Temp Pulse Resp BP MAP O2 Sat   36.5 °C (97.7 °F) 67 18 (!) 146/81 108 100 %     Blood Pressures         2024  1013 2024  1133 2024  1331 2024  1429 2024  1525    BP: 135/93 118/91 143/83 120/75 146/81             Physical Exam  General: NAD, mood  appropriate  Cardiopulmonary: warm and well perfused, breathing comfortably on room air  Abdomen: Gravid, non-tender  Extremities: full range of motion  Speculum Exam: Nitrizine test is positive, deferred, grossly ruptured  Cervix: 1 /90 /-1      Fetal Monitoring  Baseline: 135 bpm, Variability: moderate,  Accelerations: absent and Decelerations: none    Uterine Activity: q3 minutes    Interpretation: Category 2    Bedside ultrasound: once pt moved to labor room    Labs in chart were reviewed.   Results from last 7 days   Lab Units 09/04/24  1057   WBC AUTO x10*3/uL 12.4*   HEMOGLOBIN g/dL 12.3   HEMATOCRIT % 35.8*   PLATELETS AUTO x10*3/uL 170   AST U/L 11   ALT U/L 9   CREATININE mg/dL 0.61        Prenatal labs reviewed, not remarkable.

## 2024-09-04 NOTE — PROGRESS NOTES
Late entry    Pt began having more persistent variable decelerations  SVE 2/90/-3 head no longer well applied, no cord palpable.  FSE/IUPC placed and amnioinfusion started.  Position changes with minimal improvement. Discussed decelerations/cat 2 tracing and being remote from delivery-  section recommended at this time while baby still with good recovery and variability between contractions. Risks, expectations for recovery reviewed with patient and she agrees to proceed.

## 2024-09-04 NOTE — ANESTHESIA POSTPROCEDURE EVALUATION
Patient: Emy Renae    Procedure Summary       Date: 24 Room / Location: [facility] OB    Anesthesia Start: 1558 Anesthesia Stop:     Procedure: OBGYN Delivery  Section Diagnosis: (Fetal Intolerance of Labor)    Surgeons: Isabela Wise DO Responsible Provider: KRISTI Valentin    Anesthesia Type: epidural ASA Status: 2            Anesthesia Type: epidural    Vitals Value Taken Time   /76 24 1715   Temp 36.5 °C (97.7 °F) 24 1715   Pulse 62 24 1734   Resp 18 24 1715   SpO2 99 % 24 1734       Anesthesia Post Evaluation    Patient location during evaluation: bedside  Patient participation: complete - patient participated  Level of consciousness: awake and alert  Pain score: 0  Pain management: adequate  Airway patency: patent  Cardiovascular status: acceptable  Respiratory status: acceptable  Hydration status: acceptable  Postoperative Nausea and Vomiting: none        No notable events documented.

## 2024-09-04 NOTE — ANESTHESIA PROCEDURE NOTES
Spinal Block    Patient location during procedure: OR  Start time: 9/4/2024 4:00 PM  End time: 9/4/2024 4:03 PM  Reason for block: primary anesthetic and at surgeon's request  Staffing  Performed: TERESA   Authorized by: KRISTI Valentin    Performed by: KRISTI Valentin    Preanesthetic Checklist  Completed: patient identified, IV checked, risks and benefits discussed, surgical consent, pre-op evaluation, timeout performed and sterile techniques followed  Block Timeout  RN/Licensed healthcare professional reads aloud to the Anesthesia provider and entire team: Patient identity, procedure with side and site, patient position, and as applicable the availability of implants/special equipment/special requirements.  Patient on coagulant treatment: no  Timeout performed at: 9/4/2024 4:00 PM  Spinal Block  Patient position: sitting  Prep: ChloraPrep  Sterility prep: mask, hand hygiene, gloves, drape and cap  Sedation level: no sedation  Patient monitoring: heart rate, continuous pulse oximetry and blood pressure  Approach: midline  Vertebral space: L3-4  Injection technique: single-shot  Needle  Needle type: Quincke   Needle gauge: 25 G  Needle length: 3.5 in  Free flowing CSF: yes    Assessment  Sensory level: T4 bilateral  Block outcome: Allis test negative  Procedure assessment: patient tolerated procedure well with no immediate complications

## 2024-09-04 NOTE — L&D DELIVERY NOTE
OB Delivery Note  2024  Emy Renae  23 y.o.   , Low Transverse       Gestational Age: 37w3d  /Para:   Quantitative Blood Loss: Admission to Discharge: 853 mL (2024  9:57 AM - 2024  6:33 PM)    Keke Renae [[id number]]      Labor Events    Sac identifier: Sac 1  Rupture date/time: 2024 0736  Rupture type: Spontaneous  Fluid color: Clear  Fluid odor: None  Labor type: Spontaneous Onset of Labor  Labor allowed to proceed with plans for an attempted vaginal birth?: Yes  Augmentation: None  Complications: Fetal Intolerance       Labor Event Times    Decision date/time (emergent ): 2024 1552       Placenta    Placenta delivery date/time: 2024 1620  Placenta removal: Expressed  Placenta appearance: Intact  Placenta disposition: pathology       Cord    Vessels: 3 vessels  Complications: None  Delayed cord clamping?: Yes  Cord clamped date/time: 2024 16:18:00  Cord blood disposition: Lab  Gases sent?: Yes  Stem cell collection (by provider): No       Lacerations    Episiotomy: None  Perineal laceration: None  Other lacerations?: No       Anesthesia    Method: Spinal       Operative Delivery    Forceps attempted?: No  Vacuum extractor attempted?: No       Shoulder Dystocia    Shoulder dystocia present?: No       [place] Delivery    Time head delivered: 2024 16:17:00  Birth date/time: 2024 16:17:00  Delivery type: , Low Transverse   categorization: primary   priority: urgent  Indications for : Fetal Intolerance of Labor  Incision type: low transverse  Complications: Fetal Intolerance       Resuscitation    Method: Tactile stimulation, Suctioning       Apgars    Living status: Living  Apgar Component Scores:  1 min.:  5 min.:  10 min.:  15 min.:  20 min.:    Skin color:  0  1       Heart rate:  2  2       Reflex irritability:  2  2       Muscle tone:  1  1       Respiratory effort:  2  2       Total:  7  8        Apgars assigned by: JUDY HASSAN RN       Delivery Providers    Delivering clinician: Isabela Wise DO   Provider Role    Haily Jenkins RN Delivery Nurse    Khushi Hassan, RN Nursery Nurse     Resident                 OB  Delivery Note    2024  Emy Renae  23 y.o.     Review the Delivery Report for details.     Gestational Age: 37w3d  /Para:   Labor Complications: Fetal Intolerance  Estimated Blood Loss:   Delivery Blood Loss   Intrapartum & Postpartum: 24 1614 - 24 1736    Delivery Admission: 24 0957 - 24 1736         Intrapartum & Postpartum Delivery Admission    Quantitative Blood Loss - (mL) Hospital Encounter - 853 mL    Total  0 mL 853 mL          Quantitative Blood Loss: 853 mL  Delivery Type: , Low Transverse  ROM to Delivery Time: 8h 41m   Sex: Male  [place] Weight: 3 kg   1 Minute 5 Minute 10 Minute   Apgar Totals: 7   8          Keke Renae [[id number]]      Delivery Providers    Delivering clinician: Isabela Wise DO   Provider Role    Haily Jenkins RN Delivery Nurse    Khushi Hassan RN Nursery Nurse     Resident                Details    Pre-Op Diagnosis: 1. Intrauterine pregnancy at 37w3d  2. Fetal intolerance to labor   Post-Op Diagnosis: 1. Same   Indications:  Fetal Intolerance of Labor   Procedure: primary , Low Transverse via low transverse incision   Anesthesia: Spinal   Findings: Male infant delivered, weighing 3 kg. Normal uterus, tubes, and ovaries.   Informed Consent:  The risks, benefits, complications, and alternatives were discussed with the patient. The patient understood that the risks of  section include, but are not limited to: injury to nearby structures or organs, infection, blood loss and possible need for transfusion, and potential need for more surgery including hysterectomy. The patient stated understanding and desired to proceed. All questions were answered. The site  of surgery was properly noted and marked. The patient was identified as Emy Renae and the procedure verified as a  delivery. A Time Out was held and the above information confirmed.    Procedure Details:  The patient was taken to the operating room where Spinal anesthesia was found to be adequate. Antibiotics were given for infection prophylaxis. She was prepped and draped in the normal sterile fashion in the dorsal supine position with leftward tilt. A Pfannenstiel skin incision was made with scalpel. The incision was carried down to the fascia with bovie cauterization. The fascia was incised and extended laterally with Sharif scissors. The inferior aspect of the fascia was grasped with Kocher clamps and dissected bluntly and using cautery.. In a similar fashion, the superior aspect of the fascia was elevated with Kocher clamps, and the rectus muscle was dissected off. The rectus muscles were   down the midline down to the level of the pubic symphysis. The peritoneum was identified and entered sharply. Peritoneal incision was extended superiorly and inferiorly with good visualization of the bladder.    The bladder blade was inserted.. The lower uterine segment was then incised with a low transverse incision and was extended bluntly. The amniotic sac was ruptured and Clear color noted. The bladder blade was removed and the infant was delivered atraumatically using the usual maneuvers. The remainder of the infant was delivered, the cord clamped and cut, and the baby was handed off to the awaiting clinicians.     The placenta was removed via manual massage. The uterus was then exteriorized and cleared of all clots and debris. The hysterotomy was repaired with suture of 0 chromic in a running locked fashion. A second imbricating layer of the same suture was placed and good hemostasis observed. The ovaries and tubes appeared normal bilaterally. The uterus, tubes, and ovaries were then returned to  the abdomen and pelvis. The uterine incision was reinspected and found to be hemostatic. The subfascial spaces were inspected and noted to be hemostatic. The fascia was then reapproximated with 1.0 stratafix. The subcutaneous space was irrigated and closed with 3.0 Stratafix. The skin was closed with 4.0 stratafix sutures.    The patient tolerated the procedure well. Sponge, instrument, and needle counts were correct and the patient was taken to the recovery room in good and stable condition.        Isabela Wise, DO

## 2024-09-04 NOTE — TELEPHONE ENCOUNTER
Patient called stating her water broke and I advised her to go to L&D at [facility].

## 2024-09-04 NOTE — PROGRESS NOTES
[facility] LABOR PROGRESS NOTE    Subjective:    Patient is doing well with contractions.   Partner is supportive and present at the bedside.    Contraction pain is mild, she is s/p nubain x1.   Epidural:  not at this time    Objective:   Visit Vitals  /75   Pulse 95   Temp 36.5 °C (97.7 °F) (Temporal)   Resp 18       FHR:      Baseline 125  Variability; moderate  Accels; Reactive  Decels  occasional variables                                      Contraction Frequency:  irregular, every 7-8 minutes    Cervical Exam: deferred /- on admission    Membranes:  are SROM since 0700 this AM    Pitocin:  No      Assessment:  Emy Renae is a 23 y.o.  at 37w3d  PROM now at 8 hours post rupture. Remains afebrile and asymptomatic. Plan to limit cervical exams  Latent Labor  Fetal Heart Rate Category 2 - overall reassuring  GBS: neg   Plan:  Consider pitocin after 20 minute cat I tracing  Activity as tolerated  Clear liquids  Encouraged to rest  Frequent position changes  Continuous fetal monitoring  Re-evaluate in 2 hours or as needed  Expectant management  Reviewed with Dr Wise who agreed with plan of care    Electronically signed by JACY Valentine on 2024 at 3:01 PM

## 2024-09-04 NOTE — ANESTHESIA PREPROCEDURE EVALUATION
Patient: Emy Renae    Evaluation Method: In-person visit    Procedure Information    Date: 09/04/24  Procedure: Labor Consult         Relevant Problems   Pulmonary  Reactive airway with bronchitis used albuterol 4-5 months ago.      /Renal   (+) Urinary tract infection      ID   (+) Urinary tract infection       Clinical information reviewed:   Tobacco  Allergies  Meds  Problems  Med Hx  Surg Hx   Fam Hx  Soc   Hx        NPO Detail:  No data recorded     OB/Gyn Evaluation    Present Pregnancy    Patient is pregnant now.   Obstetric History            Physical Exam    Airway  Mallampati: I  TM distance: >3 FB  Neck ROM: full     Cardiovascular - normal exam  Rhythm: regular  Rate: normal     Dental    Pulmonary - normal exam  Breath sounds clear to auscultation     Abdominal      Other findings: gravid      Anesthesia Plan    History of general anesthesia?: yes  History of complications of general anesthesia?: no    ASA 2     epidural     The patient is not a current smoker.    Anesthetic plan and risks discussed with patient.  Use of blood products discussed with patient who consented to blood products.

## 2024-09-05 ENCOUNTER — APPOINTMENT (OUTPATIENT)
Dept: OBSTETRICS AND GYNECOLOGY | Facility: CLINIC | Age: 24
End: 2024-09-05
Payer: COMMERCIAL

## 2024-09-05 ENCOUNTER — PHARMACY VISIT (OUTPATIENT)
Dept: PHARMACY | Facility: CLINIC | Age: 24
End: 2024-09-05
Payer: COMMERCIAL

## 2024-09-05 LAB
ERYTHROCYTE [DISTWIDTH] IN BLOOD BY AUTOMATED COUNT: 12.8 % (ref 11.5–14.5)
HCT VFR BLD AUTO: 32.8 % (ref 36–46)
HGB BLD-MCNC: 11.3 G/DL (ref 12–16)
MCH RBC QN AUTO: 29.4 PG (ref 26–34)
MCHC RBC AUTO-ENTMCNC: 34.5 G/DL (ref 32–36)
MCV RBC AUTO: 85 FL (ref 80–100)
NRBC BLD-RTO: 0 /100 WBCS (ref 0–0)
PLATELET # BLD AUTO: 186 X10*3/UL (ref 150–450)
RBC # BLD AUTO: 3.84 X10*6/UL (ref 4–5.2)
WBC # BLD AUTO: 17 X10*3/UL (ref 4.4–11.3)

## 2024-09-05 PROCEDURE — 85027 COMPLETE CBC AUTOMATED: CPT | Performed by: OBSTETRICS & GYNECOLOGY

## 2024-09-05 PROCEDURE — 36415 COLL VENOUS BLD VENIPUNCTURE: CPT | Performed by: OBSTETRICS & GYNECOLOGY

## 2024-09-05 PROCEDURE — 1120000001 HC OB PRIVATE ROOM DAILY

## 2024-09-05 PROCEDURE — 2500000001 HC RX 250 WO HCPCS SELF ADMINISTERED DRUGS (ALT 637 FOR MEDICARE OP): Performed by: OBSTETRICS & GYNECOLOGY

## 2024-09-05 PROCEDURE — RXMED WILLOW AMBULATORY MEDICATION CHARGE

## 2024-09-05 PROCEDURE — 2500000004 HC RX 250 GENERAL PHARMACY W/ HCPCS (ALT 636 FOR OP/ED): Performed by: OBSTETRICS & GYNECOLOGY

## 2024-09-05 RX ORDER — ACETAMINOPHEN 500 MG
1000 TABLET ORAL EVERY 6 HOURS PRN
Qty: 60 TABLET | Refills: 0 | Status: SHIPPED | OUTPATIENT
Start: 2024-09-05

## 2024-09-05 RX ORDER — IBUPROFEN 600 MG/1
600 TABLET ORAL EVERY 6 HOURS PRN
Qty: 30 TABLET | Refills: 0 | Status: SHIPPED | OUTPATIENT
Start: 2024-09-05

## 2024-09-05 RX ADMIN — SIMETHICONE 80 MG: 80 TABLET, CHEWABLE ORAL at 13:18

## 2024-09-05 RX ADMIN — ACETAMINOPHEN 975 MG: 325 TABLET ORAL at 13:18

## 2024-09-05 RX ADMIN — ENOXAPARIN SODIUM 40 MG: 40 INJECTION SUBCUTANEOUS at 06:13

## 2024-09-05 RX ADMIN — POLYETHYLENE GLYCOL 3350 17 G: 17 POWDER, FOR SOLUTION ORAL at 13:18

## 2024-09-05 RX ADMIN — IBUPROFEN 600 MG: 600 TABLET ORAL at 18:12

## 2024-09-05 RX ADMIN — ACETAMINOPHEN 975 MG: 325 TABLET ORAL at 06:13

## 2024-09-05 RX ADMIN — OXYCODONE HYDROCHLORIDE 5 MG: 5 TABLET ORAL at 22:37

## 2024-09-05 RX ADMIN — KETOROLAC TROMETHAMINE 30 MG: 30 INJECTION, SOLUTION INTRAMUSCULAR; INTRAVENOUS at 06:13

## 2024-09-05 RX ADMIN — ACETAMINOPHEN 975 MG: 325 TABLET ORAL at 18:12

## 2024-09-05 RX ADMIN — KETOROLAC TROMETHAMINE 30 MG: 30 INJECTION, SOLUTION INTRAMUSCULAR; INTRAVENOUS at 13:18

## 2024-09-05 ASSESSMENT — PAIN SCALES - GENERAL
PAINLEVEL_OUTOF10: 1
PAINLEVEL_OUTOF10: 6
PAINLEVEL_OUTOF10: 7
PAINLEVEL_OUTOF10: 5 - MODERATE PAIN
PAINLEVEL_OUTOF10: 0 - NO PAIN
PAINLEVEL_OUTOF10: 2

## 2024-09-05 NOTE — PROGRESS NOTES
Postpartum Progress Note    Assessment/Plan   Emy Renae is a 23 y.o., , who delivered at 37w3d gestation and is now postpartum day 1.    Feeling well    Assessment & Plan  Pregnancy with 37 weeks completed gestation ([facility])    Leakage of amniotic fluid ([facility])    Pregnancy Problems (from 24 to present)       Problem Noted Resolved    Pregnancy with 37 weeks completed gestation ([facility]) 2024 by Isabela Wise, DO No    Priority:  Medium      Leakage of amniotic fluid ([facility]) 2024 by Isabela Wise, DO No    Priority:  Medium            Hospital course: gestational hypertension   section  Bottle feeding  Pt blood type is A POS. Rhogam not indicated    Subjective   Her pain is well controlled with current medications  She is passing flatus  She is ambulating well  She is tolerating a Adult diet Regular  She reports no breast or nursing problems  She denies emotional concerns today   Her plan for contraception is none         Objective   Allergies:   Patient has no known allergies.         Last Vitals:  Temp Pulse Resp BP MAP Pulse Ox   36.4 °C (97.5 °F) 61 18 (!) 142/90 99 98 %     Vitals Min/Max Last 24 Hours:  Temp  Min: 36 °C (96.8 °F)  Max: 36.5 °C (97.7 °F)  Pulse  Min: 55  Max: 104  Resp  Min: 18  Max: 19  BP  Min: 117/76  Max: 198/107  MAP (mmHg)  Min: 88  Max: 144    Intake/Output:     Intake/Output Summary (Last 24 hours) at 2024 0842  Last data filed at 2024 0100  Gross per 24 hour   Intake 1000 ml   Output 2098 ml   Net -1098 ml       Physical Exam:  General: Examination reveals a well developed, well nourished, female, in no acute distress. She is alert and cooperative.  Lungs: clear to auscultation bilaterally.  Cardiac: regular rate and rhythm, S1, S2 normal, no murmur, click, rub or gallop.  Incision: dressing intact without drainage or surrounding erythema.  Fundus: firm and below umbilicus.  Extremities: no redness or tenderness in the calves or thighs,  no edema.  Neurological: alert, oriented, normal speech, no focal findings or movement disorder noted.  Psychological: awake and alert; oriented to person, place, and time.    Lab Data:  Lab Results   Component Value Date    WBC 12.4 (H) 09/04/2024    HGB 12.3 09/04/2024    HCT 35.8 (L) 09/04/2024     09/04/2024

## 2024-09-05 NOTE — DISCHARGE INSTRUCTIONS

## 2024-09-05 NOTE — CARE PLAN
The patient's goals for the shift include pain control and  bonding     The clinical goals for the shift include Bps < 160/110

## 2024-09-05 NOTE — CARE PLAN
The patient's goals for the shift include progression of labor    The clinical goals for the shift include bond with baby

## 2024-09-05 NOTE — CARE PLAN
The patient's goals for the shift include decrease in pain from surgery    Pt meeting postpartum milestones.   Problem:  Recovery Care  Goal: Manages discomfort  Outcome: Progressing  Goal: Patient vital signs are stable  Outcome: Progressing  Goal: Urine output is 0.5 mL/kg/hr or more  Outcome: Progressing  Goal: Fundus firm at midline  Outcome: Progressing     Problem: Postpartum  Goal: Experiences normal postpartum course  Outcome: Progressing  Goal: Incisions, wounds, or drain sites healing without S/S of infection  Outcome: Progressing  Goal: Minimal s/sx of HDP and BP<160/110  Outcome: Progressing

## 2024-09-05 NOTE — PROGRESS NOTES
Emy Renae is a 23 y.o., , who had a , Low Transverse delivery on 2024 at 37w3d and is now POD1.    She had Neuraxial Anesthesia without immediate complications noted.       Pain well controlled    Vitals:    24 1547   BP: (!) 130/92   Pulse: 75   Resp: 18   Temp: 37 °C (98.6 °F)   SpO2: 98%       Neuraxial site assessed. No visible redness or swelling or drainage. Patient able to ambulate and move all extremities without difficulty. Able to void. No complaints of nausea/vomiting. Tolerating PO intake well. No s/sx of PDPH.     Anesthesia will sign off     PEGGY Meier-CRNA

## 2024-09-06 ENCOUNTER — PHARMACY VISIT (OUTPATIENT)
Dept: PHARMACY | Facility: CLINIC | Age: 24
End: 2024-09-06
Payer: COMMERCIAL

## 2024-09-06 PROCEDURE — 2500000001 HC RX 250 WO HCPCS SELF ADMINISTERED DRUGS (ALT 637 FOR MEDICARE OP): Performed by: OBSTETRICS & GYNECOLOGY

## 2024-09-06 PROCEDURE — RXMED WILLOW AMBULATORY MEDICATION CHARGE

## 2024-09-06 PROCEDURE — 1120000001 HC OB PRIVATE ROOM DAILY

## 2024-09-06 PROCEDURE — 2500000004 HC RX 250 GENERAL PHARMACY W/ HCPCS (ALT 636 FOR OP/ED): Performed by: OBSTETRICS & GYNECOLOGY

## 2024-09-06 RX ORDER — ACETAMINOPHEN 500 MG
1 TABLET ORAL 2 TIMES DAILY
Qty: 1 EACH | Refills: 0 | Status: SHIPPED | OUTPATIENT
Start: 2024-09-06

## 2024-09-06 RX ADMIN — ACETAMINOPHEN 975 MG: 325 TABLET ORAL at 06:33

## 2024-09-06 RX ADMIN — IBUPROFEN 600 MG: 600 TABLET ORAL at 12:34

## 2024-09-06 RX ADMIN — OXYCODONE HYDROCHLORIDE 5 MG: 5 TABLET ORAL at 16:07

## 2024-09-06 RX ADMIN — IBUPROFEN 600 MG: 600 TABLET ORAL at 06:33

## 2024-09-06 RX ADMIN — OXYCODONE HYDROCHLORIDE 5 MG: 5 TABLET ORAL at 21:13

## 2024-09-06 RX ADMIN — ENOXAPARIN SODIUM 40 MG: 40 INJECTION SUBCUTANEOUS at 06:33

## 2024-09-06 RX ADMIN — ACETAMINOPHEN 975 MG: 325 TABLET ORAL at 12:34

## 2024-09-06 RX ADMIN — ACETAMINOPHEN 975 MG: 325 TABLET ORAL at 18:46

## 2024-09-06 RX ADMIN — ACETAMINOPHEN 975 MG: 325 TABLET ORAL at 00:24

## 2024-09-06 RX ADMIN — IBUPROFEN 600 MG: 600 TABLET ORAL at 18:46

## 2024-09-06 RX ADMIN — IBUPROFEN 600 MG: 600 TABLET ORAL at 00:24

## 2024-09-06 ASSESSMENT — PAIN SCALES - GENERAL
PAINLEVEL_OUTOF10: 8
PAINLEVEL_OUTOF10: 3
PAINLEVEL_OUTOF10: 7
PAINLEVEL_OUTOF10: 8
PAINLEVEL_OUTOF10: 0 - NO PAIN
PAINLEVEL_OUTOF10: 4
PAINLEVEL_OUTOF10: 4
PAINLEVEL_OUTOF10: 3

## 2024-09-06 ASSESSMENT — PAIN DESCRIPTION - LOCATION
LOCATION: ABDOMEN
LOCATION: OTHER (COMMENT)

## 2024-09-06 NOTE — PROGRESS NOTES
Postpartum Progress Note:    Subjective   23 y.o.  on day 2 postop  Feels well & happy. Ambulating and independent self-care  Formula feeding   Pain: well controlled with PO meds  Denies headache, visual changes or epigastric pain  + Void and flatus     Objective    Last Vitals  Temp Pulse Resp BP MAP O2 Sat   36.2 °C (97.2 °F) 75 18 111/62   97 %   B/p's normal to mild range  breasts soft, nontender, no s/s  nipple trauma  abdomen non-distended  fundus firm, u/2  Incision dressing c/d/i  Lochia scant    Assessment/Plan   Emy Renae is a 23 y.o., , who delivered at 37w3d gestation and is now postpartum day 2.  Continue routine postpartum care and monitoring of gestational hypertenstion  Pain well controlled on PO medications  Patient has been assessed to have a DVT risk score of 6, prophylactic lovenox indicated  Patient is Rh Positive (Rh+).  Contraception methods discussed, plans nexplanon before discharge  Bonding well with baby  Normal recovery & involution to date. Appropriate for discharge on PPD #3 if remains stable  Advised patient to follow up on Tuesday in the office for a b/p check and in 2 weeks with primary OB provider for incision check    JACY Camacho

## 2024-09-07 VITALS
WEIGHT: 218.37 LBS | RESPIRATION RATE: 18 BRPM | TEMPERATURE: 97.5 F | HEIGHT: 65 IN | SYSTOLIC BLOOD PRESSURE: 143 MMHG | HEART RATE: 77 BPM | OXYGEN SATURATION: 98 % | BODY MASS INDEX: 36.38 KG/M2 | DIASTOLIC BLOOD PRESSURE: 97 MMHG

## 2024-09-07 PROCEDURE — 11981 INSERTION DRUG DLVR IMPLANT: CPT | Performed by: ADVANCED PRACTICE MIDWIFE

## 2024-09-07 PROCEDURE — 2500000004 HC RX 250 GENERAL PHARMACY W/ HCPCS (ALT 636 FOR OP/ED): Performed by: ADVANCED PRACTICE MIDWIFE

## 2024-09-07 PROCEDURE — 2500000001 HC RX 250 WO HCPCS SELF ADMINISTERED DRUGS (ALT 637 FOR MEDICARE OP): Performed by: OBSTETRICS & GYNECOLOGY

## 2024-09-07 PROCEDURE — 2500000005 HC RX 250 GENERAL PHARMACY W/O HCPCS: Performed by: ADVANCED PRACTICE MIDWIFE

## 2024-09-07 RX ORDER — LIDOCAINE HYDROCHLORIDE AND EPINEPHRINE 10; 10 MG/ML; UG/ML
5 INJECTION, SOLUTION INFILTRATION; PERINEURAL ONCE
Status: DISCONTINUED | OUTPATIENT
Start: 2024-09-07 | End: 2024-09-07 | Stop reason: WASHOUT

## 2024-09-07 RX ORDER — OXYCODONE HYDROCHLORIDE 5 MG/1
5 TABLET ORAL EVERY 4 HOURS PRN
Qty: 15 TABLET | Refills: 0 | Status: SHIPPED | OUTPATIENT
Start: 2024-09-07

## 2024-09-07 RX ORDER — LIDOCAINE HCL/EPINEPHRINE/PF 2%-1:200K
1.7 VIAL (ML) INJECTION ONCE
Status: COMPLETED | OUTPATIENT
Start: 2024-09-07 | End: 2024-09-07

## 2024-09-07 RX ADMIN — LIDOCAINE HYDROCHLORIDE,EPINEPHRINE BITARTRATE 1.7 ML: 20; .005 INJECTION, SOLUTION EPIDURAL; INFILTRATION; INTRACAUDAL; PERINEURAL at 13:30

## 2024-09-07 RX ADMIN — ETONOGESTREL 1 EACH: 68 IMPLANT SUBCUTANEOUS at 13:34

## 2024-09-07 RX ADMIN — OXYCODONE HYDROCHLORIDE 5 MG: 5 TABLET ORAL at 09:28

## 2024-09-07 RX ADMIN — IBUPROFEN 600 MG: 600 TABLET ORAL at 12:01

## 2024-09-07 RX ADMIN — OXYCODONE HYDROCHLORIDE 5 MG: 5 TABLET ORAL at 14:13

## 2024-09-07 RX ADMIN — ACETAMINOPHEN 975 MG: 325 TABLET ORAL at 05:38

## 2024-09-07 RX ADMIN — ACETAMINOPHEN 975 MG: 325 TABLET ORAL at 12:01

## 2024-09-07 RX ADMIN — IBUPROFEN 600 MG: 600 TABLET ORAL at 05:38

## 2024-09-07 ASSESSMENT — PAIN SCALES - GENERAL
PAINLEVEL_OUTOF10: 6
PAINLEVEL_OUTOF10: 6
PAINLEVEL_OUTOF10: 4

## 2024-09-07 NOTE — PROCEDURES
Patient desires Nexplanon  Left arm    Expiration   Lot [id number]    Betadine x 2 with alcohol prepped  Lidocaine with epi 3ml infiltrated  Nexplanon inserted without difficulty  Capsule palpated by me  Hemostatic  2x2 with opsite applied      09/07/24 at 1:55 PM - PEGGY Mims-[facility]

## 2024-09-07 NOTE — DISCHARGE SUMMARY
Discharge Summary    Admission Date: 2024  Discharge Date: 2024    Discharge Diagnosis  Pregnancy with 37 weeks completed gestation ([facility])    Hospital Course  Delivery Date: 2024 4:17 PM  Delivery type: , Low Transverse   GA at delivery: 37w3d  Outcome: Living  Anesthesia during delivery: Spinal  Intrapartum complications: Fetal Intolerance  Feeding method: Breastfeeding Status: No     Procedures: none  Contraception at discharge: Nexplanon      Pertinent Physical Exam At Time of Discharge    General: Examination reveals a well developed, well nourished, female, in no acute distress. She is alert and cooperative.  Lungs: clear to auscultation bilaterally.  Cardiac: regular rate and rhythm, S1, S2 normal, no murmur, click, rub or gallop.  Breasts: non lactating with bra on.  Abdomen: soft, gravid, nontender, nondistended, no abnormal masses, no epigastric pain, bowel sounds present x 4.  Incision: dressing intact.  Fundus: firm and below umbilicus.  Extremities: no redness or tenderness in the calves or thighs, no edema.  Neurological: alert, oriented, normal speech, no focal findings or movement disorder noted.  Psychological: awake and alert; oriented to person, place, and time.    Last Vitals:  Temp Pulse Resp BP MAP Pulse Ox   36.4 °C (97.5 °F) 77 18 (!) 143/97 108 98 %     Discharge Meds     Your medication list        START taking these medications        Instructions Last Dose Given Next Dose Due   blood pressure test kit-large kit      Use to check blood pressure 2 times a day.       ibuprofen 600 mg tablet      Take 1 tablet (600 mg) by mouth every 6 hours if needed for mild pain (1 - 3).       Pain Relief ES (acetaminophen) 500 mg tablet  Generic drug: acetaminophen      Take 2 tablets (1,000 mg) by mouth every 6 hours if needed for mild pain (1 - 3).              CONTINUE taking these medications        Instructions Last Dose Given Next Dose Due   albuterol 90 mcg/actuation  inhaler                     Where to Get Your Medications        These medications were sent to [facility]  [address], [address], [facility] [address]      Hours: 8 AM to 6 PM Mon-Fri, 9 AM to 1 PM Saturday Phone: [phone]   blood pressure test kit-large kit  ibuprofen 600 mg tablet  Pain Relief ES (acetaminophen) 500 mg tablet          Complications Requiring Follow-Up  Has BP check follow up on Tuesday  Has 2 week incision check scheduled    Test Results Pending At Discharge  Pending Labs       Order Current Status    Surgical Pathology Exam - PLACENTA In process            Outpatient Follow-Up  Future Appointments   Date Time Provider Department Center   9/10/2024 11:45 AM Gemma Petersen MD [id number]   9/16/2024  8:00 AM [facility] U L&D PROCEDURE ROOM [facility] spent 10 minutes in the professional and overall care of this patient.      PEGGY Mims-ISMA

## 2024-09-07 NOTE — NURSING NOTE
Discharge AVS for pt and baby reviewed with pt and FOB. Review included follow up appts, activity, s/s to call dr or go to ER for baby and post birth warning signs for pt. Reviewed circ and cord care, feeding guidelines, safe sleep. Pt verbalized understanding, asked appropriate questions, denied further needs. Ambulatory for discharge upon request.

## 2024-09-10 ENCOUNTER — APPOINTMENT (OUTPATIENT)
Dept: OBSTETRICS AND GYNECOLOGY | Facility: CLINIC | Age: 24
End: 2024-09-10
Payer: COMMERCIAL

## 2024-09-10 VITALS
HEIGHT: 65 IN | DIASTOLIC BLOOD PRESSURE: 74 MMHG | WEIGHT: 202 LBS | BODY MASS INDEX: 33.66 KG/M2 | SYSTOLIC BLOOD PRESSURE: 140 MMHG

## 2024-09-10 DIAGNOSIS — Z01.30 BP CHECK: ICD-10-CM

## 2024-09-10 LAB
LABORATORY COMMENT REPORT: NORMAL
PATH REPORT.FINAL DX SPEC: NORMAL
PATH REPORT.GROSS SPEC: NORMAL
PATH REPORT.RELEVANT HX SPEC: NORMAL
PATH REPORT.TOTAL CANCER: NORMAL

## 2024-09-10 PROCEDURE — 1036F TOBACCO NON-USER: CPT | Performed by: OBSTETRICS & GYNECOLOGY

## 2024-09-10 PROCEDURE — 99213 OFFICE O/P EST LOW 20 MIN: CPT | Performed by: OBSTETRICS & GYNECOLOGY

## 2024-09-10 PROCEDURE — 3008F BODY MASS INDEX DOCD: CPT | Performed by: OBSTETRICS & GYNECOLOGY

## 2024-09-10 ASSESSMENT — PAIN SCALES - GENERAL: PAINLEVEL: 1

## 2024-09-10 NOTE — PROGRESS NOTES
"Patient here for blood pressure check  Primary  section  for nonreassuring fetal status  Elevated blood pressures postpartum but no medications started  Patient states blood pressures at home were initially 160/90 but have gradually improved  Patient has not been keeping track of her blood pressures on paper  Patient had a headache yesterday but it has since resolved  No vision changes or right upper quadrant pain  Bleeding is like a period  Postoperative pain controlled with Tylenol and Motrin  Patient is currently bottlefeeding    /74   Ht 1.651 m (5' 5\")   Wt 91.6 kg (202 lb)   LMP 2023   Breastfeeding No   BMI 33.61 kg/m²     A/P: Gestational hypertension, no medication. No symptoms of preeclampsia  - Patient will continue to check blood pressure twice daily, parameters given  - Patient will message me at the end of the week with her blood pressure readings  Follow-up in 1 week for incision check  "

## 2024-09-12 ENCOUNTER — HOSPITAL ENCOUNTER (OUTPATIENT)
Facility: HOSPITAL | Age: 24
Discharge: HOME | End: 2024-09-12
Attending: OBSTETRICS & GYNECOLOGY | Admitting: OBSTETRICS & GYNECOLOGY
Payer: COMMERCIAL

## 2024-09-12 VITALS
SYSTOLIC BLOOD PRESSURE: 138 MMHG | OXYGEN SATURATION: 97 % | DIASTOLIC BLOOD PRESSURE: 91 MMHG | RESPIRATION RATE: 16 BRPM | HEART RATE: 97 BPM | TEMPERATURE: 98.4 F

## 2024-09-12 PROCEDURE — 99212 OFFICE O/P EST SF 10 MIN: CPT | Performed by: OBSTETRICS & GYNECOLOGY

## 2024-09-12 RX ORDER — NIFEDIPINE 30 MG/1
30 TABLET, FILM COATED, EXTENDED RELEASE ORAL
Qty: 30 TABLET | Refills: 0 | Status: SHIPPED | OUTPATIENT
Start: 2024-09-12 | End: 2024-09-17 | Stop reason: SINTOL

## 2024-09-12 SDOH — HEALTH STABILITY: MENTAL HEALTH: WERE YOU ABLE TO COMPLETE ALL THE BEHAVIORAL HEALTH SCREENINGS?: YES

## 2024-09-12 SDOH — SOCIAL STABILITY: SOCIAL INSECURITY: DOES ANYONE TRY TO KEEP YOU FROM HAVING/CONTACTING OTHER FRIENDS OR DOING THINGS OUTSIDE YOUR HOME?: NO

## 2024-09-12 SDOH — SOCIAL STABILITY: SOCIAL INSECURITY: DO YOU FEEL ANYONE HAS EXPLOITED OR TAKEN ADVANTAGE OF YOU FINANCIALLY OR OF YOUR PERSONAL PROPERTY?: NO

## 2024-09-12 SDOH — HEALTH STABILITY: MENTAL HEALTH: NON-SPECIFIC ACTIVE SUICIDAL THOUGHTS (PAST 1 MONTH): NO

## 2024-09-12 SDOH — SOCIAL STABILITY: SOCIAL INSECURITY: HAS ANYONE EVER THREATENED TO HURT YOUR FAMILY OR YOUR PETS?: NO

## 2024-09-12 SDOH — SOCIAL STABILITY: SOCIAL INSECURITY: ARE THERE ANY APPARENT SIGNS OF INJURIES/BEHAVIORS THAT COULD BE RELATED TO ABUSE/NEGLECT?: NO

## 2024-09-12 SDOH — SOCIAL STABILITY: SOCIAL INSECURITY: ABUSE SCREEN: ADULT

## 2024-09-12 SDOH — ECONOMIC STABILITY: HOUSING INSECURITY: DO YOU FEEL UNSAFE GOING BACK TO THE PLACE WHERE YOU ARE LIVING?: NO

## 2024-09-12 SDOH — HEALTH STABILITY: MENTAL HEALTH: SUICIDAL BEHAVIOR (LIFETIME): NO

## 2024-09-12 SDOH — HEALTH STABILITY: MENTAL HEALTH: WISH TO BE DEAD (PAST 1 MONTH): NO

## 2024-09-12 SDOH — SOCIAL STABILITY: SOCIAL INSECURITY: VERBAL ABUSE: DENIES

## 2024-09-12 SDOH — SOCIAL STABILITY: SOCIAL INSECURITY: HAVE YOU HAD THOUGHTS OF HARMING ANYONE ELSE?: NO

## 2024-09-12 SDOH — SOCIAL STABILITY: SOCIAL INSECURITY: HAVE YOU HAD ANY THOUGHTS OF HARMING ANYONE ELSE?: NO

## 2024-09-12 SDOH — SOCIAL STABILITY: SOCIAL INSECURITY: ARE YOU OR HAVE YOU BEEN THREATENED OR ABUSED PHYSICALLY, EMOTIONALLY, OR SEXUALLY BY ANYONE?: NO

## 2024-09-12 SDOH — SOCIAL STABILITY: SOCIAL INSECURITY: PHYSICAL ABUSE: DENIES

## 2024-09-12 ASSESSMENT — PATIENT HEALTH QUESTIONNAIRE - PHQ9
2. FEELING DOWN, DEPRESSED OR HOPELESS: NOT AT ALL
1. LITTLE INTEREST OR PLEASURE IN DOING THINGS: NOT AT ALL
SUM OF ALL RESPONSES TO PHQ9 QUESTIONS 1 & 2: 0

## 2024-09-12 ASSESSMENT — LIFESTYLE VARIABLES
AUDIT-C TOTAL SCORE: 0
AUDIT-C TOTAL SCORE: 0
HOW OFTEN DO YOU HAVE A DRINK CONTAINING ALCOHOL: NEVER
HOW OFTEN DO YOU HAVE 6 OR MORE DRINKS ON ONE OCCASION: NEVER
SKIP TO QUESTIONS 9-10: 1
HOW MANY STANDARD DRINKS CONTAINING ALCOHOL DO YOU HAVE ON A TYPICAL DAY: PATIENT DOES NOT DRINK

## 2024-09-12 ASSESSMENT — PAIN SCALES - GENERAL: PAINLEVEL_OUTOF10: 0 - NO PAIN

## 2024-09-12 NOTE — DISCHARGE INSTRUCTIONS
Call your provider if you experience:  Notify provider for bad-smelling vaginal blood or discharge  Notify provider for blurry vision or spots before your eyes  Notify provider for fever or chills  Notify provider for headache that does not get better, even after taking medicine  Notify provider for heavy bleeding  Soaking a large pad every hour or passing large clots.  Notify provider for incision that is not healing, is red or more painful, or has pus or drainage  Notify provider for pain, burning or difficulty with emptying your bladder  Notify provider for red or swollen breast that is painful or warm to touch  Notify provider for red or swollen leg that is painful or warm to touch  Notify provider for signs of depression  Examples include: Persistent sadness, frequent crying, sleep problems, excessive worrying, feeling unable to cope.  Trust your instincts. Always get medical care if you are not feeling well  Or if you have questions or concerns.    Than

## 2024-09-12 NOTE — H&P
Note Type:  OB Triage H&PObstetrical Triage Note    Reason for Triage Observation: Hypertension    Assessment   hypertension  Triage Testing revealed Mild range BP's   Patient's complaints have stayed the same.    Plan   Discharge home.   Start Procardia XR 30mg daily.  BP log given with instructions on proper technique    Subjective   History of Present Pregnancy  Emy Renae is a 23 y.o. The patient's blood type is A POS.   Triage Course:  Pt presented with c/o severe range BP's at home. Severes occurred when she took readings back to back or when she had not been sitting for >1 minute. She denies headaches, blurred vision, RUQ pain, significant edema. She was diagnosed with gHTN during delivery admission    Objective   Recent Vital Signs:                                 BP (!) 138/91 Comment: Simultaneous filing. User may not have seen previous data.  Pulse 97 Comment: Simultaneous filing. User may not have seen previous data.  Temp 36.9 °C (98.4 °F) (Temporal)   Resp 16     BP & Temp Min/Max Last 24 Hours:     BP  Min: 138/91   Min taken time: 24 1131  Max: 138/91   Max taken time: 24 1131  Temp  Av.9 °C (98.4 °F)  Min: 36.9 °C (98.4 °F)   Min taken time: 24 1131  Max: 36.9 °C (98.4 °F)   Max taken time: 24 1131    Physical Examination:  GENERAL: Examination reveals a well developed, well nourished, gravid female in no acute distress. She is alert and cooperative.  LUNGS: breathing unlabored  ABDOMEN: soft nontender no epigastric or RUQ tenderness  EXTREMITIES: no redness or tenderness in the calves or thighs, edema TR+  NEUROLOGICAL: alert, oriented, normal speech, no focal findings or movement disorder noted  PSYCHOLOGICAL: awake and alert; oriented to person, place, and time  Incision- dressing removed and incision well approximated without erythema or drainage

## 2024-09-17 ENCOUNTER — APPOINTMENT (OUTPATIENT)
Dept: OBSTETRICS AND GYNECOLOGY | Facility: CLINIC | Age: 24
End: 2024-09-17
Payer: COMMERCIAL

## 2024-09-17 VITALS
SYSTOLIC BLOOD PRESSURE: 128 MMHG | HEIGHT: 65 IN | BODY MASS INDEX: 32.32 KG/M2 | DIASTOLIC BLOOD PRESSURE: 78 MMHG | WEIGHT: 194 LBS | TEMPERATURE: 99 F

## 2024-09-17 DIAGNOSIS — Z09 SURGERY FOLLOW-UP EXAMINATION: ICD-10-CM

## 2024-09-17 PROCEDURE — 0503F POSTPARTUM CARE VISIT: CPT | Performed by: OBSTETRICS & GYNECOLOGY

## 2024-09-17 RX ORDER — LABETALOL 200 MG/1
100 TABLET, FILM COATED ORAL 2 TIMES DAILY
Status: SHIPPED | OUTPATIENT
Start: 2024-09-17

## 2024-09-17 ASSESSMENT — PAIN SCALES - GENERAL: PAINLEVEL: 0-NO PAIN

## 2024-09-17 NOTE — PROGRESS NOTES
Patient here for blood pressure check  Blood pressures at home generally 120s to 130s over 80s to low 90s.  Had 1 systolic 141  Blood pressure today normal  Patient feels red and warm when she takes nifedipine  Is getting headaches with nifedipine but not every day  Headaches are not relieved with Tylenol  Does not currently have a headache    Patient doing well postoperatively  Not breast-feeding  Had Nexplanon placed prior to discharge from hospital      PE  Inc healed, remainder of Steri-Strips removed    A/P: Normal incision check.  Blood pressure stable but side effects with nifedipine  -Discontinue nifedipine and start labetalol 100 mg twice daily  -Patient will notify me of blood pressure readings towards the end of the week  -Follow-up 4 weeks for postpartum exam

## 2024-09-19 ENCOUNTER — TELEPHONE (OUTPATIENT)
Dept: OBSTETRICS AND GYNECOLOGY | Facility: CLINIC | Age: 24
End: 2024-09-19
Payer: COMMERCIAL

## 2024-09-19 DIAGNOSIS — Z01.30 BP CHECK: Primary | ICD-10-CM

## 2024-09-19 RX ORDER — LABETALOL 100 MG/1
100 TABLET, FILM COATED ORAL 2 TIMES DAILY
Qty: 60 TABLET | Refills: 11 | Status: SHIPPED | OUTPATIENT
Start: 2024-09-19 | End: 2025-09-19

## 2024-09-19 NOTE — TELEPHONE ENCOUNTER
Patient states she was in on Tuesday and a new BP med was supposed to be sent to the [facility] in [place].  However, her pharmacy did not receive the RX.  Can you please send?    Jennifer Long MA

## 2024-09-23 ENCOUNTER — DOCUMENTATION (OUTPATIENT)
Dept: OBSTETRICS AND GYNECOLOGY | Facility: CLINIC | Age: 24
End: 2024-09-23
Payer: COMMERCIAL

## 2024-09-23 DIAGNOSIS — R39.9 UTI SYMPTOMS: Primary | ICD-10-CM

## 2024-09-23 NOTE — PROGRESS NOTES
Called patient regarding recent ER visit  Urine culture pending  Labs normal  Patient feeling better after dose of IV antibiotics  Will start oral antibiotics tomorrow  We will follow-up on urine culture

## 2024-09-24 ENCOUNTER — HOSPITAL ENCOUNTER (OUTPATIENT)
Facility: HOSPITAL | Age: 24
Discharge: ANOTHER HOSPITAL | End: 2024-09-25
Attending: OBSTETRICS & GYNECOLOGY | Admitting: OBSTETRICS & GYNECOLOGY
Payer: COMMERCIAL

## 2024-09-24 ENCOUNTER — TELEPHONE (OUTPATIENT)
Dept: OBSTETRICS AND GYNECOLOGY | Facility: CLINIC | Age: 24
End: 2024-09-24
Payer: COMMERCIAL

## 2024-09-24 ENCOUNTER — APPOINTMENT (OUTPATIENT)
Dept: RADIOLOGY | Facility: HOSPITAL | Age: 24
End: 2024-09-24
Payer: COMMERCIAL

## 2024-09-24 LAB
ALBUMIN SERPL BCP-MCNC: 4.5 G/DL (ref 3.4–5)
ALP SERPL-CCNC: 87 U/L (ref 33–110)
ALT SERPL W P-5'-P-CCNC: 11 U/L (ref 7–45)
AMYLASE SERPL-CCNC: 21 U/L (ref 29–103)
ANION GAP SERPL CALC-SCNC: 17 MMOL/L (ref 10–20)
AST SERPL W P-5'-P-CCNC: 14 U/L (ref 9–39)
BASOPHILS # BLD AUTO: 0.11 X10*3/UL (ref 0–0.1)
BASOPHILS NFR BLD AUTO: 1.1 %
BILIRUB SERPL-MCNC: 0.4 MG/DL (ref 0–1.2)
BILIRUBIN, POC: NEGATIVE
BLOOD URINE, POC: POSITIVE
BUN SERPL-MCNC: 13 MG/DL (ref 6–23)
CALCIUM SERPL-MCNC: 9.7 MG/DL (ref 8.6–10.3)
CHLORIDE SERPL-SCNC: 105 MMOL/L (ref 98–107)
CLARITY, POC: NORMAL
CO2 SERPL-SCNC: 20 MMOL/L (ref 21–32)
COLOR, POC: NORMAL
CREAT SERPL-MCNC: 0.99 MG/DL (ref 0.5–1.05)
EGFRCR SERPLBLD CKD-EPI 2021: 81 ML/MIN/1.73M*2
EOSINOPHIL # BLD AUTO: 0.22 X10*3/UL (ref 0–0.7)
EOSINOPHIL NFR BLD AUTO: 2.1 %
ERYTHROCYTE [DISTWIDTH] IN BLOOD BY AUTOMATED COUNT: 12.3 % (ref 11.5–14.5)
GLUCOSE SERPL-MCNC: 104 MG/DL (ref 74–99)
GLUCOSE URINE, POC: NEGATIVE
HCT VFR BLD AUTO: 37.6 % (ref 36–46)
HGB BLD-MCNC: 12.9 G/DL (ref 12–16)
IMM GRANULOCYTES # BLD AUTO: 0.03 X10*3/UL (ref 0–0.7)
IMM GRANULOCYTES NFR BLD AUTO: 0.3 % (ref 0–0.9)
KETONES, POC: NEGATIVE
LDH SERPL L TO P-CCNC: 176 U/L (ref 84–246)
LEUKOCYTE EST, POC: NORMAL
LIPASE SERPL-CCNC: 14 U/L (ref 9–82)
LYMPHOCYTES # BLD AUTO: 3.64 X10*3/UL (ref 1.2–4.8)
LYMPHOCYTES NFR BLD AUTO: 35.3 %
MCH RBC QN AUTO: 28.5 PG (ref 26–34)
MCHC RBC AUTO-ENTMCNC: 34.3 G/DL (ref 32–36)
MCV RBC AUTO: 83 FL (ref 80–100)
MONOCYTES # BLD AUTO: 0.38 X10*3/UL (ref 0.1–1)
MONOCYTES NFR BLD AUTO: 3.7 %
NEUTROPHILS # BLD AUTO: 5.92 X10*3/UL (ref 1.2–7.7)
NEUTROPHILS NFR BLD AUTO: 57.5 %
NITRITE, POC: NEGATIVE
NRBC BLD-RTO: 0 /100 WBCS (ref 0–0)
PH, POC: 6.5
PLATELET # BLD AUTO: 446 X10*3/UL (ref 150–450)
POC APPEARANCE OF BODY FLUID: NORMAL
POTASSIUM SERPL-SCNC: 3.5 MMOL/L (ref 3.5–5.3)
PROT SERPL-MCNC: 7.1 G/DL (ref 6.4–8.2)
RBC # BLD AUTO: 4.52 X10*6/UL (ref 4–5.2)
SODIUM SERPL-SCNC: 138 MMOL/L (ref 136–145)
SPECIFIC GRAVITY, POC: 1.03
URATE SERPL-MCNC: 6.1 MG/DL (ref 2.3–6.7)
URINE PROTEIN, POC: NORMAL
UROBILINOGEN, POC: 0.2
WBC # BLD AUTO: 10.3 X10*3/UL (ref 4.4–11.3)

## 2024-09-24 PROCEDURE — 74177 CT ABD & PELVIS W/CONTRAST: CPT

## 2024-09-24 PROCEDURE — 2500000004 HC RX 250 GENERAL PHARMACY W/ HCPCS (ALT 636 FOR OP/ED): Performed by: OBSTETRICS & GYNECOLOGY

## 2024-09-24 PROCEDURE — 82150 ASSAY OF AMYLASE: CPT | Performed by: OBSTETRICS & GYNECOLOGY

## 2024-09-24 PROCEDURE — 81002 URINALYSIS NONAUTO W/O SCOPE: CPT | Performed by: OBSTETRICS & GYNECOLOGY

## 2024-09-24 PROCEDURE — 84550 ASSAY OF BLOOD/URIC ACID: CPT | Performed by: OBSTETRICS & GYNECOLOGY

## 2024-09-24 PROCEDURE — 83615 LACTATE (LD) (LDH) ENZYME: CPT | Performed by: OBSTETRICS & GYNECOLOGY

## 2024-09-24 PROCEDURE — 85025 COMPLETE CBC W/AUTO DIFF WBC: CPT | Performed by: OBSTETRICS & GYNECOLOGY

## 2024-09-24 PROCEDURE — 2550000001 HC RX 255 CONTRASTS: Performed by: OBSTETRICS & GYNECOLOGY

## 2024-09-24 PROCEDURE — 83690 ASSAY OF LIPASE: CPT | Performed by: OBSTETRICS & GYNECOLOGY

## 2024-09-24 PROCEDURE — 99214 OFFICE O/P EST MOD 30 MIN: CPT | Performed by: OBSTETRICS & GYNECOLOGY

## 2024-09-24 PROCEDURE — 74177 CT ABD & PELVIS W/CONTRAST: CPT | Performed by: RADIOLOGY

## 2024-09-24 PROCEDURE — 2500000001 HC RX 250 WO HCPCS SELF ADMINISTERED DRUGS (ALT 637 FOR MEDICARE OP): Performed by: OBSTETRICS & GYNECOLOGY

## 2024-09-24 PROCEDURE — 80053 COMPREHEN METABOLIC PANEL: CPT | Performed by: OBSTETRICS & GYNECOLOGY

## 2024-09-24 PROCEDURE — 36415 COLL VENOUS BLD VENIPUNCTURE: CPT | Performed by: OBSTETRICS & GYNECOLOGY

## 2024-09-24 RX ORDER — HYDRALAZINE HYDROCHLORIDE 20 MG/ML
5 INJECTION INTRAMUSCULAR; INTRAVENOUS ONCE AS NEEDED
Status: DISCONTINUED | OUTPATIENT
Start: 2024-09-24 | End: 2024-09-25 | Stop reason: HOSPADM

## 2024-09-24 RX ORDER — HYDROMORPHONE HYDROCHLORIDE 1 MG/ML
0.4 INJECTION, SOLUTION INTRAMUSCULAR; INTRAVENOUS; SUBCUTANEOUS
Status: DISCONTINUED | OUTPATIENT
Start: 2024-09-24 | End: 2024-09-25 | Stop reason: HOSPADM

## 2024-09-24 RX ORDER — NIFEDIPINE 10 MG/1
10 CAPSULE ORAL ONCE AS NEEDED
Status: DISCONTINUED | OUTPATIENT
Start: 2024-09-24 | End: 2024-09-25 | Stop reason: HOSPADM

## 2024-09-24 RX ORDER — ONDANSETRON HYDROCHLORIDE 2 MG/ML
4 INJECTION, SOLUTION INTRAVENOUS EVERY 6 HOURS PRN
Status: DISCONTINUED | OUTPATIENT
Start: 2024-09-24 | End: 2024-09-25 | Stop reason: HOSPADM

## 2024-09-24 RX ORDER — SODIUM CHLORIDE, SODIUM LACTATE, POTASSIUM CHLORIDE, CALCIUM CHLORIDE 600; 310; 30; 20 MG/100ML; MG/100ML; MG/100ML; MG/100ML
125 INJECTION, SOLUTION INTRAVENOUS CONTINUOUS
Status: DISCONTINUED | OUTPATIENT
Start: 2024-09-24 | End: 2024-09-25 | Stop reason: HOSPADM

## 2024-09-24 RX ORDER — ONDANSETRON 4 MG/1
4 TABLET, FILM COATED ORAL EVERY 6 HOURS PRN
Status: DISCONTINUED | OUTPATIENT
Start: 2024-09-24 | End: 2024-09-25 | Stop reason: HOSPADM

## 2024-09-24 RX ORDER — MORPHINE SULFATE 4 MG/ML
4 INJECTION, SOLUTION INTRAMUSCULAR; INTRAVENOUS EVERY 4 HOURS PRN
Status: DISCONTINUED | OUTPATIENT
Start: 2024-09-24 | End: 2024-09-24

## 2024-09-24 RX ORDER — LABETALOL 100 MG/1
100 TABLET, FILM COATED ORAL 2 TIMES DAILY
Status: DISCONTINUED | OUTPATIENT
Start: 2024-09-24 | End: 2024-09-25 | Stop reason: HOSPADM

## 2024-09-24 RX ORDER — CEFTRIAXONE 1 G/50ML
1 INJECTION, SOLUTION INTRAVENOUS EVERY 24 HOURS
Status: DISCONTINUED | OUTPATIENT
Start: 2024-09-24 | End: 2024-09-25 | Stop reason: HOSPADM

## 2024-09-24 RX ORDER — LABETALOL HYDROCHLORIDE 5 MG/ML
20 INJECTION, SOLUTION INTRAVENOUS ONCE AS NEEDED
Status: DISCONTINUED | OUTPATIENT
Start: 2024-09-24 | End: 2024-09-25 | Stop reason: HOSPADM

## 2024-09-24 RX ORDER — LIDOCAINE HYDROCHLORIDE 10 MG/ML
0.5 INJECTION, SOLUTION EPIDURAL; INFILTRATION; INTRACAUDAL; PERINEURAL ONCE AS NEEDED
Status: DISCONTINUED | OUTPATIENT
Start: 2024-09-24 | End: 2024-09-25 | Stop reason: HOSPADM

## 2024-09-24 SDOH — SOCIAL STABILITY: SOCIAL INSECURITY: DOES ANYONE TRY TO KEEP YOU FROM HAVING/CONTACTING OTHER FRIENDS OR DOING THINGS OUTSIDE YOUR HOME?: NO

## 2024-09-24 SDOH — SOCIAL STABILITY: SOCIAL INSECURITY: PHYSICAL ABUSE: DENIES

## 2024-09-24 SDOH — SOCIAL STABILITY: SOCIAL INSECURITY: ARE YOU OR HAVE YOU BEEN THREATENED OR ABUSED PHYSICALLY, EMOTIONALLY, OR SEXUALLY BY ANYONE?: NO

## 2024-09-24 SDOH — ECONOMIC STABILITY: HOUSING INSECURITY: DO YOU FEEL UNSAFE GOING BACK TO THE PLACE WHERE YOU ARE LIVING?: NO

## 2024-09-24 SDOH — HEALTH STABILITY: MENTAL HEALTH: WISH TO BE DEAD (PAST 1 MONTH): NO

## 2024-09-24 SDOH — SOCIAL STABILITY: SOCIAL INSECURITY: VERBAL ABUSE: DENIES

## 2024-09-24 SDOH — HEALTH STABILITY: MENTAL HEALTH: SUICIDAL BEHAVIOR (LIFETIME): NO

## 2024-09-24 SDOH — HEALTH STABILITY: MENTAL HEALTH: NON-SPECIFIC ACTIVE SUICIDAL THOUGHTS (PAST 1 MONTH): NO

## 2024-09-24 SDOH — SOCIAL STABILITY: SOCIAL INSECURITY: DO YOU FEEL ANYONE HAS EXPLOITED OR TAKEN ADVANTAGE OF YOU FINANCIALLY OR OF YOUR PERSONAL PROPERTY?: NO

## 2024-09-24 SDOH — SOCIAL STABILITY: SOCIAL INSECURITY: HAS ANYONE EVER THREATENED TO HURT YOUR FAMILY OR YOUR PETS?: NO

## 2024-09-24 SDOH — SOCIAL STABILITY: SOCIAL INSECURITY: HAVE YOU HAD THOUGHTS OF HARMING ANYONE ELSE?: NO

## 2024-09-24 SDOH — HEALTH STABILITY: MENTAL HEALTH: WERE YOU ABLE TO COMPLETE ALL THE BEHAVIORAL HEALTH SCREENINGS?: YES

## 2024-09-24 SDOH — SOCIAL STABILITY: SOCIAL INSECURITY: ABUSE SCREEN: ADULT

## 2024-09-24 SDOH — SOCIAL STABILITY: SOCIAL INSECURITY: ARE THERE ANY APPARENT SIGNS OF INJURIES/BEHAVIORS THAT COULD BE RELATED TO ABUSE/NEGLECT?: NO

## 2024-09-24 SDOH — SOCIAL STABILITY: SOCIAL INSECURITY: HAVE YOU HAD ANY THOUGHTS OF HARMING ANYONE ELSE?: NO

## 2024-09-24 ASSESSMENT — LIFESTYLE VARIABLES
HOW MANY STANDARD DRINKS CONTAINING ALCOHOL DO YOU HAVE ON A TYPICAL DAY: PATIENT DOES NOT DRINK
AUDIT-C TOTAL SCORE: 0
AUDIT-C TOTAL SCORE: 0
SKIP TO QUESTIONS 9-10: 1
HOW OFTEN DO YOU HAVE 6 OR MORE DRINKS ON ONE OCCASION: NEVER
HOW OFTEN DO YOU HAVE A DRINK CONTAINING ALCOHOL: NEVER

## 2024-09-24 ASSESSMENT — ACTIVITIES OF DAILY LIVING (ADL): LACK_OF_TRANSPORTATION: NO

## 2024-09-24 ASSESSMENT — PAIN SCALES - GENERAL
PAINLEVEL_OUTOF10: 10 - WORST POSSIBLE PAIN

## 2024-09-24 NOTE — H&P
OB Triage H&P    Assessment/Plan    3 weeks postop   Gestational hypertension with initial blood pressure 150/101  Intermittent, acute onset severe pain in the left flank    While presumptive treatment was initially started yesterday for potential UTI, the acute onset of her pain along with the fact that it resolved for 18 to 24 hours after hydration and treatment yesterday, but returned today is most consistent with acute nephrolithiasis.  Until culture returns, will cover with Rocephin.    Check CBC, CMP, LDH, point-of-care urinalysis and stat CT of the abdomen and pelvis.    Will IV hydrate with 1 L bolus followed by maintenance fluids.  4 mg morphine for pain control acutely  Strain urine    Reinitiate labetalol 100 mg twice daily, first dose this evening.      ADDENDUM: CT reveals distal ureteral stone.  Pt seen and has some relief w dilaudid.    Disc w Medicine hospitalist and will transfer to their service and plan urology consult in am.      Pregnancy Problems (from 24 to present)       Problem Noted Resolved    Pregnancy with 37 weeks completed gestation ([facility]) 2024 by Isabela Wise, DO No    Priority:  Medium      Leakage of amniotic fluid ([facility]) 2024 by Isabela Wise, DO No    Priority:  Medium              Subjective     24-year-old -0-1-1 who is 20 days status post low-transverse  section for fetal intolerance of labor.  Postpartum course complicated by gestational hypertension.  Was on nifedipine with improving blood pressures but had side effects of headache and was recently changed to labetalol 100 mg twice daily.  Patient states last blood pressure dose was last evening.    Patient was recovering uneventfully until the early morning of  (just after midnight).  Had fairly sudden onset of left flank pain this waxed and waned but became intense around 5 AM and she went to the emergency department.  While there had a white blood cell count of 13,000  along with a urinalysis showing trace leukocyte Estrace, 6-10 white blood cells per high-power field, 3-5 red blood cells per high-power field, in the context of being less than 3 weeks postpartum.  Patient states she received 1 dose of IV pain medicine.  The emergency room spoke to her about possible admission for this pain but she was feeling better after that dose and declined admission.  Urine culture is pending.    She then went home and was well until 9 AM this morning when the dull ache in the left flank returned.  This intensified through the day and now presents with severe left flank pain.  Throughout this whole course she has had no fever, chills.  She has had nausea and vomiting secondary to the pain.  She has not taken her blood pressure medicine this morning nor has she recorded blood pressures at home.    Denies headache, visual changes.  Feels somewhat lightheaded when the pain is intense.  OB History    Para Term  AB Living   2 1 1 0 1 1   SAB IAB Ectopic Multiple Live Births   0 1 0 0 1      # Outcome Date GA Lbr Wade/2nd Weight Sex Type Anes PTL Lv   2 Term 24 37w3d  3 kg M CS-LTranv Spinal  JUDE      Complications: Fetal Intolerance      Name: Flynn Aldridge      Apgar1: 7  Apgar5: 8   1 IAB                Past Surgical History:   Procedure Laterality Date    OTHER SURGICAL HISTORY  2022    Pilonidal cyst removal       Social History     Tobacco Use    Smoking status: Former     Types: Cigarettes    Smokeless tobacco: Never   Substance Use Topics    Alcohol use: Not Currently       No Known Allergies    Facility-Administered Medications Prior to Admission   Medication Dose Route Frequency Provider Last Rate Last Admin    labetalol (Normodyne) tablet 100 mg  100 mg oral BID Gemma Petersen MD         Medications Prior to Admission   Medication Sig Dispense Refill Last Dose    blood pressure test kit-large kit Use to check blood pressure 2 times a day. 1 each 0 Past Week     labetalol (Normodyne) 100 mg tablet Take 1 tablet (100 mg) by mouth 2 times a day. 60 tablet 11 9/24/2024    acetaminophen (Tylenol) 500 mg tablet Take 2 tablets (1,000 mg) by mouth every 6 hours if needed for mild pain (1 - 3). 60 tablet 0 Unknown    ibuprofen 600 mg tablet Take 1 tablet (600 mg) by mouth every 6 hours if needed for mild pain (1 - 3). 30 tablet 0 Unknown     Objective     Last Vitals  Temp Pulse Resp BP MAP O2 Sat   36.1 °C (97 °F) 67 18 (!) 153/101 120 (!) 93 %     Blood Pressures         9/24/2024  1852             BP: 153/101             Physical Exam  Patient alert, cooperative, in significant pain sitting upright in bed.  CV - RRR w/o MRG  Pulm - CTA  Abdomen is soft and nontender.  No rebound or guarding.  Specifically suprapubically there is no tenderness.  Incision is clean dry intact and well-healed for 3 weeks postop.    Right costovertebral angle is nontender.  Left CVA tenderness significant to palpation  Pelvic exam is deferred as clinical abdominal exam is not consistent with an endometritis

## 2024-09-24 NOTE — TELEPHONE ENCOUNTER
Patient notified Dr. Petersen would like her to go to the ER for evaluation or to L&D at [facility] if she is less than 6 weeks PP.    Jennifer Long MA

## 2024-09-24 NOTE — TELEPHONE ENCOUNTER
Patient states that the pain in her back is returning and she would like to know if she should go back to the ER or come in to see you today.  Please advise.    Jennifer Long MA

## 2024-09-25 ENCOUNTER — HOSPITAL ENCOUNTER (INPATIENT)
Facility: HOSPITAL | Age: 24
LOS: 1 days | Discharge: HOME | End: 2024-09-25
Attending: HOSPITALIST | Admitting: HOSPITALIST
Payer: COMMERCIAL

## 2024-09-25 ENCOUNTER — TELEPHONE (OUTPATIENT)
Dept: OBSTETRICS AND GYNECOLOGY | Facility: CLINIC | Age: 24
End: 2024-09-25

## 2024-09-25 VITALS
RESPIRATION RATE: 16 BRPM | HEART RATE: 52 BPM | OXYGEN SATURATION: 99 % | BODY MASS INDEX: 32.18 KG/M2 | DIASTOLIC BLOOD PRESSURE: 70 MMHG | SYSTOLIC BLOOD PRESSURE: 122 MMHG | TEMPERATURE: 97 F | WEIGHT: 193.12 LBS | HEIGHT: 65 IN

## 2024-09-25 VITALS
HEIGHT: 65 IN | SYSTOLIC BLOOD PRESSURE: 132 MMHG | BODY MASS INDEX: 32.18 KG/M2 | RESPIRATION RATE: 20 BRPM | OXYGEN SATURATION: 98 % | TEMPERATURE: 96.5 F | WEIGHT: 193.12 LBS | DIASTOLIC BLOOD PRESSURE: 82 MMHG | HEART RATE: 75 BPM

## 2024-09-25 DIAGNOSIS — N20.0 NEPHROLITHIASIS: Primary | ICD-10-CM

## 2024-09-25 LAB
ABO GROUP (TYPE) IN BLOOD: NORMAL
ANION GAP SERPL CALC-SCNC: 13 MMOL/L (ref 10–20)
ANTIBODY SCREEN: NORMAL
BASOPHILS # BLD AUTO: 0.05 X10*3/UL (ref 0–0.1)
BASOPHILS NFR BLD AUTO: 0.5 %
BUN SERPL-MCNC: 11 MG/DL (ref 6–23)
CALCIUM SERPL-MCNC: 8.6 MG/DL (ref 8.6–10.3)
CHLORIDE SERPL-SCNC: 107 MMOL/L (ref 98–107)
CO2 SERPL-SCNC: 22 MMOL/L (ref 21–32)
CREAT SERPL-MCNC: 0.8 MG/DL (ref 0.5–1.05)
EGFRCR SERPLBLD CKD-EPI 2021: >90 ML/MIN/1.73M*2
EOSINOPHIL # BLD AUTO: 0 X10*3/UL (ref 0–0.7)
EOSINOPHIL NFR BLD AUTO: 0 %
ERYTHROCYTE [DISTWIDTH] IN BLOOD BY AUTOMATED COUNT: 12.1 % (ref 11.5–14.5)
GLUCOSE SERPL-MCNC: 119 MG/DL (ref 74–99)
HCT VFR BLD AUTO: 33.7 % (ref 36–46)
HGB BLD-MCNC: 11.7 G/DL (ref 12–16)
IMM GRANULOCYTES # BLD AUTO: 0.03 X10*3/UL (ref 0–0.7)
IMM GRANULOCYTES NFR BLD AUTO: 0.3 % (ref 0–0.9)
INR PPP: 1.2 (ref 0.9–1.1)
LYMPHOCYTES # BLD AUTO: 1.22 X10*3/UL (ref 1.2–4.8)
LYMPHOCYTES NFR BLD AUTO: 12.9 %
MAGNESIUM SERPL-MCNC: 1.6 MG/DL (ref 1.6–2.4)
MCH RBC QN AUTO: 29.1 PG (ref 26–34)
MCHC RBC AUTO-ENTMCNC: 34.7 G/DL (ref 32–36)
MCV RBC AUTO: 84 FL (ref 80–100)
MONOCYTES # BLD AUTO: 0.18 X10*3/UL (ref 0.1–1)
MONOCYTES NFR BLD AUTO: 1.9 %
NEUTROPHILS # BLD AUTO: 7.98 X10*3/UL (ref 1.2–7.7)
NEUTROPHILS NFR BLD AUTO: 84.4 %
NRBC BLD-RTO: 0 /100 WBCS (ref 0–0)
PLATELET # BLD AUTO: 323 X10*3/UL (ref 150–450)
POTASSIUM SERPL-SCNC: 3.7 MMOL/L (ref 3.5–5.3)
PROTHROMBIN TIME: 13.7 SECONDS (ref 9.8–12.8)
RBC # BLD AUTO: 4.02 X10*6/UL (ref 4–5.2)
RH FACTOR (ANTIGEN D): NORMAL
SODIUM SERPL-SCNC: 138 MMOL/L (ref 136–145)
WBC # BLD AUTO: 9.5 X10*3/UL (ref 4.4–11.3)

## 2024-09-25 PROCEDURE — G0378 HOSPITAL OBSERVATION PER HR: HCPCS

## 2024-09-25 PROCEDURE — 99214 OFFICE O/P EST MOD 30 MIN: CPT

## 2024-09-25 PROCEDURE — 36415 COLL VENOUS BLD VENIPUNCTURE: CPT

## 2024-09-25 PROCEDURE — 99213 OFFICE O/P EST LOW 20 MIN: CPT

## 2024-09-25 PROCEDURE — 85025 COMPLETE CBC W/AUTO DIFF WBC: CPT | Performed by: HOSPITALIST

## 2024-09-25 PROCEDURE — 85610 PROTHROMBIN TIME: CPT | Performed by: HOSPITALIST

## 2024-09-25 PROCEDURE — 99222 1ST HOSP IP/OBS MODERATE 55: CPT | Performed by: HOSPITALIST

## 2024-09-25 PROCEDURE — 2500000002 HC RX 250 W HCPCS SELF ADMINISTERED DRUGS (ALT 637 FOR MEDICARE OP, ALT 636 FOR OP/ED): Performed by: HOSPITALIST

## 2024-09-25 PROCEDURE — 36415 COLL VENOUS BLD VENIPUNCTURE: CPT | Performed by: HOSPITALIST

## 2024-09-25 PROCEDURE — 83735 ASSAY OF MAGNESIUM: CPT | Performed by: HOSPITALIST

## 2024-09-25 PROCEDURE — 96365 THER/PROPH/DIAG IV INF INIT: CPT

## 2024-09-25 PROCEDURE — 96376 TX/PRO/DX INJ SAME DRUG ADON: CPT

## 2024-09-25 PROCEDURE — 99221 1ST HOSP IP/OBS SF/LOW 40: CPT | Performed by: NURSE PRACTITIONER

## 2024-09-25 PROCEDURE — 2500000004 HC RX 250 GENERAL PHARMACY W/ HCPCS (ALT 636 FOR OP/ED): Performed by: OBSTETRICS & GYNECOLOGY

## 2024-09-25 PROCEDURE — 2500000001 HC RX 250 WO HCPCS SELF ADMINISTERED DRUGS (ALT 637 FOR MEDICARE OP): Performed by: HOSPITALIST

## 2024-09-25 PROCEDURE — 86901 BLOOD TYPING SEROLOGIC RH(D): CPT | Performed by: HOSPITALIST

## 2024-09-25 PROCEDURE — 96375 TX/PRO/DX INJ NEW DRUG ADDON: CPT

## 2024-09-25 PROCEDURE — 96366 THER/PROPH/DIAG IV INF ADDON: CPT

## 2024-09-25 PROCEDURE — 1100000001 HC PRIVATE ROOM DAILY

## 2024-09-25 PROCEDURE — 80048 BASIC METABOLIC PNL TOTAL CA: CPT | Performed by: HOSPITALIST

## 2024-09-25 PROCEDURE — 99238 HOSP IP/OBS DSCHRG MGMT 30/<: CPT | Performed by: INTERNAL MEDICINE

## 2024-09-25 PROCEDURE — 2500000004 HC RX 250 GENERAL PHARMACY W/ HCPCS (ALT 636 FOR OP/ED): Performed by: HOSPITALIST

## 2024-09-25 RX ORDER — CEFDINIR 300 MG/1
300 CAPSULE ORAL 2 TIMES DAILY
Qty: 10 CAPSULE | Refills: 0 | Status: SHIPPED | OUTPATIENT
Start: 2024-09-25 | End: 2024-10-01 | Stop reason: ALTCHOICE

## 2024-09-25 RX ORDER — TALC
3 POWDER (GRAM) TOPICAL NIGHTLY PRN
Status: DISCONTINUED | OUTPATIENT
Start: 2024-09-25 | End: 2024-09-25 | Stop reason: HOSPADM

## 2024-09-25 RX ORDER — ACETAMINOPHEN 325 MG/1
650 TABLET ORAL EVERY 4 HOURS PRN
Status: DISCONTINUED | OUTPATIENT
Start: 2024-09-25 | End: 2024-09-25 | Stop reason: HOSPADM

## 2024-09-25 RX ORDER — SODIUM CHLORIDE 9 MG/ML
125 INJECTION, SOLUTION INTRAVENOUS CONTINUOUS
Status: DISCONTINUED | OUTPATIENT
Start: 2024-09-25 | End: 2024-09-25 | Stop reason: HOSPADM

## 2024-09-25 RX ORDER — LABETALOL 100 MG/1
100 TABLET, FILM COATED ORAL 2 TIMES DAILY
Status: DISCONTINUED | OUTPATIENT
Start: 2024-09-25 | End: 2024-09-25 | Stop reason: HOSPADM

## 2024-09-25 RX ORDER — CEFTRIAXONE 1 G/50ML
1 INJECTION, SOLUTION INTRAVENOUS EVERY 24 HOURS
Status: DISCONTINUED | OUTPATIENT
Start: 2024-09-25 | End: 2024-09-25

## 2024-09-25 RX ORDER — HYDROMORPHONE HYDROCHLORIDE 1 MG/ML
0.6 INJECTION, SOLUTION INTRAMUSCULAR; INTRAVENOUS; SUBCUTANEOUS
Status: DISCONTINUED | OUTPATIENT
Start: 2024-09-25 | End: 2024-09-25 | Stop reason: HOSPADM

## 2024-09-25 RX ORDER — HYDROMORPHONE HYDROCHLORIDE 0.2 MG/ML
0.2 INJECTION INTRAMUSCULAR; INTRAVENOUS; SUBCUTANEOUS
Status: DISCONTINUED | OUTPATIENT
Start: 2024-09-25 | End: 2024-09-25 | Stop reason: HOSPADM

## 2024-09-25 RX ORDER — MAGNESIUM SULFATE HEPTAHYDRATE 40 MG/ML
2 INJECTION, SOLUTION INTRAVENOUS ONCE
Status: COMPLETED | OUTPATIENT
Start: 2024-09-25 | End: 2024-09-25

## 2024-09-25 RX ORDER — TAMSULOSIN HYDROCHLORIDE 0.4 MG/1
0.4 CAPSULE ORAL DAILY
Status: DISCONTINUED | OUTPATIENT
Start: 2024-09-25 | End: 2024-09-25 | Stop reason: HOSPADM

## 2024-09-25 RX ORDER — ONDANSETRON HYDROCHLORIDE 2 MG/ML
4 INJECTION, SOLUTION INTRAVENOUS EVERY 8 HOURS PRN
Status: DISCONTINUED | OUTPATIENT
Start: 2024-09-25 | End: 2024-09-25 | Stop reason: HOSPADM

## 2024-09-25 RX ORDER — ONDANSETRON 4 MG/1
4 TABLET, ORALLY DISINTEGRATING ORAL EVERY 8 HOURS PRN
Status: DISCONTINUED | OUTPATIENT
Start: 2024-09-25 | End: 2024-09-25 | Stop reason: HOSPADM

## 2024-09-25 RX ORDER — KETOROLAC TROMETHAMINE 10 MG/1
10 TABLET, FILM COATED ORAL EVERY 8 HOURS PRN
Qty: 10 TABLET | Refills: 0 | Status: SHIPPED | OUTPATIENT
Start: 2024-09-25 | End: 2024-10-01 | Stop reason: ALTCHOICE

## 2024-09-25 RX ORDER — TAMSULOSIN HYDROCHLORIDE 0.4 MG/1
0.4 CAPSULE ORAL DAILY
Qty: 30 CAPSULE | Refills: 0 | Status: SHIPPED | OUTPATIENT
Start: 2024-09-26 | End: 2024-10-01 | Stop reason: ALTCHOICE

## 2024-09-25 RX ORDER — SENNOSIDES 8.6 MG/1
2 TABLET ORAL 2 TIMES DAILY
Status: DISCONTINUED | OUTPATIENT
Start: 2024-09-25 | End: 2024-09-25 | Stop reason: HOSPADM

## 2024-09-25 SDOH — SOCIAL STABILITY: SOCIAL INSECURITY: HAS ANYONE EVER THREATENED TO HURT YOUR FAMILY OR YOUR PETS?: NO

## 2024-09-25 SDOH — SOCIAL STABILITY: SOCIAL INSECURITY: DOES ANYONE TRY TO KEEP YOU FROM HAVING/CONTACTING OTHER FRIENDS OR DOING THINGS OUTSIDE YOUR HOME?: NO

## 2024-09-25 SDOH — HEALTH STABILITY: MENTAL HEALTH: HOW MANY STANDARD DRINKS CONTAINING ALCOHOL DO YOU HAVE ON A TYPICAL DAY?: 1 OR 2

## 2024-09-25 SDOH — HEALTH STABILITY: MENTAL HEALTH: HOW OFTEN DO YOU HAVE 6 OR MORE DRINKS ON ONE OCCASION?: NEVER

## 2024-09-25 SDOH — SOCIAL STABILITY: SOCIAL INSECURITY: DO YOU FEEL ANYONE HAS EXPLOITED OR TAKEN ADVANTAGE OF YOU FINANCIALLY OR OF YOUR PERSONAL PROPERTY?: NO

## 2024-09-25 SDOH — HEALTH STABILITY: MENTAL HEALTH: HOW OFTEN DO YOU HAVE A DRINK CONTAINING ALCOHOL?: MONTHLY OR LESS

## 2024-09-25 SDOH — SOCIAL STABILITY: SOCIAL INSECURITY: DO YOU FEEL UNSAFE GOING BACK TO THE PLACE WHERE YOU ARE LIVING?: NO

## 2024-09-25 SDOH — SOCIAL STABILITY: SOCIAL INSECURITY: HAVE YOU HAD ANY THOUGHTS OF HARMING ANYONE ELSE?: NO

## 2024-09-25 SDOH — SOCIAL STABILITY: SOCIAL INSECURITY: ABUSE: ADULT

## 2024-09-25 SDOH — SOCIAL STABILITY: SOCIAL INSECURITY: ARE YOU OR HAVE YOU BEEN THREATENED OR ABUSED PHYSICALLY, EMOTIONALLY, OR SEXUALLY BY ANYONE?: NO

## 2024-09-25 SDOH — SOCIAL STABILITY: SOCIAL INSECURITY: HAVE YOU HAD THOUGHTS OF HARMING ANYONE ELSE?: NO

## 2024-09-25 SDOH — SOCIAL STABILITY: SOCIAL INSECURITY: ARE THERE ANY APPARENT SIGNS OF INJURIES/BEHAVIORS THAT COULD BE RELATED TO ABUSE/NEGLECT?: NO

## 2024-09-25 SDOH — SOCIAL STABILITY: SOCIAL INSECURITY: WERE YOU ABLE TO COMPLETE ALL THE BEHAVIORAL HEALTH SCREENINGS?: YES

## 2024-09-25 ASSESSMENT — COLUMBIA-SUICIDE SEVERITY RATING SCALE - C-SSRS
1. IN THE PAST MONTH, HAVE YOU WISHED YOU WERE DEAD OR WISHED YOU COULD GO TO SLEEP AND NOT WAKE UP?: NO
2. HAVE YOU ACTUALLY HAD ANY THOUGHTS OF KILLING YOURSELF?: NO
6. HAVE YOU EVER DONE ANYTHING, STARTED TO DO ANYTHING, OR PREPARED TO DO ANYTHING TO END YOUR LIFE?: NO

## 2024-09-25 ASSESSMENT — ENCOUNTER SYMPTOMS
PSYCHIATRIC NEGATIVE: 1
HEMATURIA: 1
FEVER: 1
EYES NEGATIVE: 1
NEUROLOGICAL NEGATIVE: 1
HEMATOLOGIC/LYMPHATIC NEGATIVE: 1
CARDIOVASCULAR NEGATIVE: 1
NAUSEA: 1
CHILLS: 1
ALLERGIC/IMMUNOLOGIC NEGATIVE: 1
VOMITING: 1
FLANK PAIN: 1
RESPIRATORY NEGATIVE: 1
DYSURIA: 1

## 2024-09-25 ASSESSMENT — COGNITIVE AND FUNCTIONAL STATUS - GENERAL
MOBILITY SCORE: 24
PATIENT BASELINE BEDBOUND: NO
DAILY ACTIVITIY SCORE: 24

## 2024-09-25 ASSESSMENT — PAIN - FUNCTIONAL ASSESSMENT
PAIN_FUNCTIONAL_ASSESSMENT: 0-10
PAIN_FUNCTIONAL_ASSESSMENT: 0-10

## 2024-09-25 ASSESSMENT — PAIN SCALES - GENERAL
PAINLEVEL_OUTOF10: 6
PAINLEVEL_OUTOF10: 2
PAINLEVEL_OUTOF10: 4
PAINLEVEL_OUTOF10: 2
PAINLEVEL_OUTOF10: 7
PAINLEVEL_OUTOF10: 5 - MODERATE PAIN
PAINLEVEL_OUTOF10: 6

## 2024-09-25 ASSESSMENT — PAIN DESCRIPTION - DESCRIPTORS
DESCRIPTORS: ACHING

## 2024-09-25 ASSESSMENT — ACTIVITIES OF DAILY LIVING (ADL)
WALKS IN HOME: INDEPENDENT
TOILETING: INDEPENDENT
DRESSING YOURSELF: INDEPENDENT
PATIENT'S MEMORY ADEQUATE TO SAFELY COMPLETE DAILY ACTIVITIES?: YES
FEEDING YOURSELF: INDEPENDENT
ADEQUATE_TO_COMPLETE_ADL: YES
BATHING: INDEPENDENT
HEARING - LEFT EAR: FUNCTIONAL
HEARING - RIGHT EAR: FUNCTIONAL
JUDGMENT_ADEQUATE_SAFELY_COMPLETE_DAILY_ACTIVITIES: YES
GROOMING: INDEPENDENT
LACK_OF_TRANSPORTATION: NO
LACK_OF_TRANSPORTATION: NO

## 2024-09-25 ASSESSMENT — LIFESTYLE VARIABLES
PRESCIPTION_ABUSE_PAST_12_MONTHS: NO
AUDIT-C TOTAL SCORE: 1
AUDIT-C TOTAL SCORE: 1
SKIP TO QUESTIONS 9-10: 1
HOW OFTEN DO YOU HAVE A DRINK CONTAINING ALCOHOL: MONTHLY OR LESS
SUBSTANCE_ABUSE_PAST_12_MONTHS: NO
AUDIT-C TOTAL SCORE: 1
HOW OFTEN DO YOU HAVE 6 OR MORE DRINKS ON ONE OCCASION: NEVER
SKIP TO QUESTIONS 9-10: 1
HOW MANY STANDARD DRINKS CONTAINING ALCOHOL DO YOU HAVE ON A TYPICAL DAY: 1 OR 2

## 2024-09-25 ASSESSMENT — PAIN DESCRIPTION - ORIENTATION: ORIENTATION: LEFT

## 2024-09-25 ASSESSMENT — PAIN DESCRIPTION - LOCATION: LOCATION: ABDOMEN

## 2024-09-25 ASSESSMENT — PATIENT HEALTH QUESTIONNAIRE - PHQ9
1. LITTLE INTEREST OR PLEASURE IN DOING THINGS: NOT AT ALL
2. FEELING DOWN, DEPRESSED OR HOPELESS: SEVERAL DAYS
SUM OF ALL RESPONSES TO PHQ9 QUESTIONS 1 & 2: 1

## 2024-09-25 NOTE — TELEPHONE ENCOUNTER
Patient called stating she went to [facility] operated by [facility] ER last night and she has a kidney stone and would like to speak to you.

## 2024-09-25 NOTE — CARE PLAN
The patient's goals for the shift include      The clinical goals for the shift include pain control    Over the shift, the patient did make progress toward the following goals.   Problem: Safety - Adult  Goal: Free from fall injury  Outcome: Progressing     Problem: Chronic Conditions and Co-morbidities  Goal: Patient's chronic conditions and co-morbidity symptoms are monitored and maintained or improved  Outcome: Progressing

## 2024-09-25 NOTE — H&P
History Of Present Illness  Emy Renae is a 24 y.o. female with a PMH of gestational hypertension,  3 weeks ago presenting with dysuria and left sided flank pain.    Symptoms started  with mild dysuria, post-void pelvic pressure and foul smelling urine. She then developed left flank pain, for which she sought medical attention at [facility] ED. There UA was positive for trace LE. She was given one dose of Ceftriaxone and pain medication. Flank pain improved, and she declined admission, preferring to go home. She was discharged with a prescription of Cipro.   No imaging performed at [facility].   Flank pain worsened, she developed N/V and shaking chills. She presented to the OB/GYN ED at [facility] yesterday. CT A/P was performed and showed a 5mm calculus in the distal Left ureter in the vicinity of the UPJ, with mild hydronephrosis.     She was started on IVF, given dilaudid with significant improvement of the pain.   Ms Renae was transferred to [facility] inpt in anticipation of possible urologic procedure.        Past Medical History  Gestational HTN    Surgical History  Past Surgical History:   Procedure Laterality Date    OTHER SURGICAL HISTORY  2022    Pilonidal cyst removal        Social History  She reports that she has quit smoking. Her smoking use included cigarettes. She has never used smokeless tobacco. She reports that she does not currently use alcohol. She reports that she does not currently use drugs after having used the following drugs: Marijuana.    Family History  No family history on file.     Allergies  Patient has no known allergies.    Review of Systems   Constitutional:  Positive for chills and fever.   HENT: Negative.     Eyes: Negative.    Respiratory: Negative.     Cardiovascular: Negative.    Gastrointestinal:  Positive for nausea and vomiting.   Genitourinary:  Positive for dysuria, flank pain, hematuria and vaginal discharge.   Skin: Negative.    Allergic/Immunologic: Negative.     Neurological: Negative.    Hematological: Negative.    Psychiatric/Behavioral: Negative.          Physical Exam  Vitals reviewed.   Constitutional:       Appearance: Normal appearance.      Comments: Young female, alert, oriented, does not appear acutely ill.    HENT:      Head: Normocephalic and atraumatic.      Mouth/Throat:      Mouth: Mucous membranes are moist.   Eyes:      Extraocular Movements: Extraocular movements intact.      Conjunctiva/sclera: Conjunctivae normal.      Pupils: Pupils are equal, round, and reactive to light.   Cardiovascular:      Rate and Rhythm: Normal rate and regular rhythm.      Pulses: Normal pulses.      Heart sounds: Normal heart sounds.   Pulmonary:      Effort: Pulmonary effort is normal.      Breath sounds: Normal breath sounds.   Abdominal:      General: Abdomen is flat. Bowel sounds are normal.      Palpations: Abdomen is soft.   Musculoskeletal:         General: Normal range of motion.   Skin:     General: Skin is warm and dry.   Neurological:      General: No focal deficit present.      Mental Status: She is alert and oriented to person, place, and time.   Psychiatric:         Mood and Affect: Mood normal.         Behavior: Behavior normal.         Thought Content: Thought content normal.         Judgment: Judgment normal.          Last Recorded Vitals  Last menstrual period 12/17/2023, not currently breastfeeding.    Relevant Results        Results for orders placed or performed during the hospital encounter of 09/24/24 (from the past 24 hour(s))   CBC and Auto Differential   Result Value Ref Range    WBC 10.3 4.4 - 11.3 x10*3/uL    nRBC 0.0 0.0 - 0.0 /100 WBCs    RBC 4.52 4.00 - 5.20 x10*6/uL    Hemoglobin 12.9 12.0 - 16.0 g/dL    Hematocrit 37.6 36.0 - 46.0 %    MCV 83 80 - 100 fL    MCH 28.5 26.0 - 34.0 pg    MCHC 34.3 32.0 - 36.0 g/dL    RDW 12.3 11.5 - 14.5 %    Platelets 446 150 - 450 x10*3/uL    Neutrophils % 57.5 40.0 - 80.0 %    Immature Granulocytes %,  Automated 0.3 0.0 - 0.9 %    Lymphocytes % 35.3 13.0 - 44.0 %    Monocytes % 3.7 2.0 - 10.0 %    Eosinophils % 2.1 0.0 - 6.0 %    Basophils % 1.1 0.0 - 2.0 %    Neutrophils Absolute 5.92 1.20 - 7.70 x10*3/uL    Immature Granulocytes Absolute, Automated 0.03 0.00 - 0.70 x10*3/uL    Lymphocytes Absolute 3.64 1.20 - 4.80 x10*3/uL    Monocytes Absolute 0.38 0.10 - 1.00 x10*3/uL    Eosinophils Absolute 0.22 0.00 - 0.70 x10*3/uL    Basophils Absolute 0.11 (H) 0.00 - 0.10 x10*3/uL   Comprehensive Metabolic Panel   Result Value Ref Range    Glucose 104 (H) 74 - 99 mg/dL    Sodium 138 136 - 145 mmol/L    Potassium 3.5 3.5 - 5.3 mmol/L    Chloride 105 98 - 107 mmol/L    Bicarbonate 20 (L) 21 - 32 mmol/L    Anion Gap 17 10 - 20 mmol/L    Urea Nitrogen 13 6 - 23 mg/dL    Creatinine 0.99 0.50 - 1.05 mg/dL    eGFR 81 >60 mL/min/1.73m*2    Calcium 9.7 8.6 - 10.3 mg/dL    Albumin 4.5 3.4 - 5.0 g/dL    Alkaline Phosphatase 87 33 - 110 U/L    Total Protein 7.1 6.4 - 8.2 g/dL    AST 14 9 - 39 U/L    Bilirubin, Total 0.4 0.0 - 1.2 mg/dL    ALT 11 7 - 45 U/L   Uric Acid   Result Value Ref Range    Uric Acid 6.1 2.3 - 6.7 mg/dL   Lactate Dehydrogenase   Result Value Ref Range     84 - 246 U/L   Amylase   Result Value Ref Range    Amylase 21 (L) 29 - 103 U/L   Lipase   Result Value Ref Range    Lipase 14 9 - 82 U/L   POCT urinalysis dipstick manually resulted   Result Value Ref Range    Color, UA Dark Yellow     Clarity, UA Cloudy     Glucose, UA NEGATIVE     Bilirubin, UA NEGATIVE     Ketones, UA Negative     Spec Grav, UA 1.030     Blood, UA Positive     pH, UA 6.5     Protein, UA TRACE     Urobilinogen, UA 0.2     Leukocytes, UA TRACE     Nitrite, UA NEGATIVE     Appearance, Fluid Cloudy      CT abdomen pelvis w IV contrast    Result Date: 9/24/2024  Interpreted By:  Dung Caballero, STUDY: CT ABDOMEN PELVIS W IV CONTRAST;  9/24/2024 8:37 pm   INDICATION: Signs/Symptoms:Acute L sided flank pain - R/o nephrolithiasis.      COMPARISON: None.   ACCESSION NUMBER(S): [id number]   ORDERING CLINICIAN: THANG BORRERO   TECHNIQUE: CT of the abdomen and pelvis was performed.  Standard contiguous axial images were obtained at 3 mm slice thickness through the abdomen and pelvis. Coronal and sagittal reconstructions at 3 mm slice thickness were performed.   75 ml of contrast Omnipaque were administered intravenously without immediate complication.   FINDINGS: LOWER CHEST: Within normal limits   ABDOMEN:   LIVER: Within normal limits.   BILE DUCTS: Normal caliber.   GALLBLADDER: No calcified stones. No wall thickening.   PANCREAS: Within normal limits.   SPLEEN: Within normal limits.   ADRENAL GLANDS: Within normal limits.   KIDNEYS AND URETERS: There is a 5 mm calculus noted within the distal left ureter in the vicinity of the UVJ with associated mild left-sided hydroureteronephrosis. The right kidney is unremarkable.   PELVIS:   BLADDER: Within normal limits.   REPRODUCTIVE ORGANS: The uterus is present.   BOWEL: The stomach, small and large bowel are normal in appearance without wall thickening or obstruction.   Normal appendix.   VESSELS: The aorta and IVC appear normal.   PERITONEUM/RETROPERITONEUM/LYMPH NODES: No ascites or free air, no fluid collection.  No enlarged mesenteric lymph nodes.   BONES AND ABDOMINAL WALL: No suspicious osseous lesions are identified.  The abdominal wall soft tissues appear normal.       1.  There is a 5 mm calculus noted within the distal left ureter in the vicinity of the UVJ with associated mild left-sided hydroureteronephrosis.     MACRO: None   Signed by: Dung Caballero 9/24/2024 9:16 PM Dictation workstation:   [id number]        Assessment/Plan   Assessment & Plan  Nephrolithiasis  - IVF  - Dilaudid for pain, morphine provided little relief.   - flomax  - Zofran for N/V  - strain urine  - Urology consult  - NPO     Gestational HTN  - prescribed labetalol 100mg BID, hold parameters placed, pt  bradycardic      Post partum state  - pt not breast feeding           I spent 45 minutes in the professional and overall care of this patient.      Precious Malave MD

## 2024-09-25 NOTE — CONSULTS
"Reason For Consult  Kidney stone     History Of Present Illness  Emy Renae is a 24 y.o. female presenting with severe left side flank pain. Patient is s/p  3 weeks ago, post-partum course has been c/b hypertension and has been placed on labetolol. Patient developed urinary frequency and pelvic pressure 2 nights ago and presented to [facility] where she was told she \"possibly had a UTI.\" They started her on antibiotics and she was discharged home. She then developed severe left sided flank pain that was not relieved by pain medications. In the ED CT showed \"5 mm calculus noted within the distal left ureter in the vicinity of the UVJ with associated mild left-sided hydroureteronephrosis.\" UA and labs are negative for infection. She denies fever, chills, CP, SOB, N/V, dysuria, hematuria, and burning with urination.      Past Medical History  She has no past medical history on file.    Surgical History  She has a past surgical history that includes Other surgical history (2022).     Social History  She reports that she has quit smoking. Her smoking use included cigarettes. She has never used smokeless tobacco. She reports that she does not currently use alcohol. She reports that she does not currently use drugs after having used the following drugs: Marijuana.    Family History  No family history on file.     Allergies  Patient has no known allergies.    Review of Systems  Negative other than mentioned in the HPI     Physical Exam  .Physical Exam:  Constitutional: Well developed, awake/alert/oriented x3, no distress, alert and cooperative. Sitting up in bed, chatty,   Skin: Warm and dry, no lesions, no rashes  Eyes: PEERLA  ENMT: mucous membranes moist, no apparent injury, no lesions seen  Head/Neck: Neck supple, no apparent injury, trachea midline  Respiratory/Thorax: Patent airways, CTAB, normal breath sounds with good chest expansion, thorax symmetric  Cardiovascular: Regular, rate and rhythm, 2+ equal " "pulses of the extremities  Gastrointestinal: Non-distended, soft, non-tender, +BS  Genitourinary: voiding without difficulty, mild left flank soreness  Musculoskeletal: ROM intact, no joint swelling, normal strength.   Neurological: alert and oriented x3  Psychological: Appropriate mood and behavior.       Last Recorded Vitals  Blood pressure 126/71, pulse 57, temperature 37 °C (98.6 °F), temperature source Temporal, resp. rate 16, height 1.651 m (5' 5\"), weight 87.6 kg (193 lb 2 oz), last menstrual period 12/17/2023, SpO2 98%, not currently breastfeeding.    Relevant Results  Results for orders placed or performed during the hospital encounter of 09/25/24 (from the past 24 hour(s))   CBC and Auto Differential   Result Value Ref Range    WBC 9.5 4.4 - 11.3 x10*3/uL    nRBC 0.0 0.0 - 0.0 /100 WBCs    RBC 4.02 4.00 - 5.20 x10*6/uL    Hemoglobin 11.7 (L) 12.0 - 16.0 g/dL    Hematocrit 33.7 (L) 36.0 - 46.0 %    MCV 84 80 - 100 fL    MCH 29.1 26.0 - 34.0 pg    MCHC 34.7 32.0 - 36.0 g/dL    RDW 12.1 11.5 - 14.5 %    Platelets 323 150 - 450 x10*3/uL    Neutrophils % 84.4 40.0 - 80.0 %    Immature Granulocytes %, Automated 0.3 0.0 - 0.9 %    Lymphocytes % 12.9 13.0 - 44.0 %    Monocytes % 1.9 2.0 - 10.0 %    Eosinophils % 0.0 0.0 - 6.0 %    Basophils % 0.5 0.0 - 2.0 %    Neutrophils Absolute 7.98 (H) 1.20 - 7.70 x10*3/uL    Immature Granulocytes Absolute, Automated 0.03 0.00 - 0.70 x10*3/uL    Lymphocytes Absolute 1.22 1.20 - 4.80 x10*3/uL    Monocytes Absolute 0.18 0.10 - 1.00 x10*3/uL    Eosinophils Absolute 0.00 0.00 - 0.70 x10*3/uL    Basophils Absolute 0.05 0.00 - 0.10 x10*3/uL   Basic metabolic panel   Result Value Ref Range    Glucose 119 (H) 74 - 99 mg/dL    Sodium 138 136 - 145 mmol/L    Potassium 3.7 3.5 - 5.3 mmol/L    Chloride 107 98 - 107 mmol/L    Bicarbonate 22 21 - 32 mmol/L    Anion Gap 13 10 - 20 mmol/L    Urea Nitrogen 11 6 - 23 mg/dL    Creatinine 0.80 0.50 - 1.05 mg/dL    eGFR >90 >60 mL/min/1.73m*2    " Calcium 8.6 8.6 - 10.3 mg/dL   Magnesium   Result Value Ref Range    Magnesium 1.60 1.60 - 2.40 mg/dL   Type And Screen   Result Value Ref Range    ABO TYPE A     Rh TYPE POS     ANTIBODY SCREEN NEG    Protime-INR   Result Value Ref Range    Protime 13.7 (H) 9.8 - 12.8 seconds    INR 1.2 (H) 0.9 - 1.1     CT abdomen pelvis w IV contrast    Result Date: 9/24/2024  Interpreted By:  Dung Caballero, STUDY: CT ABDOMEN PELVIS W IV CONTRAST;  9/24/2024 8:37 pm   INDICATION: Signs/Symptoms:Acute L sided flank pain - R/o nephrolithiasis.     COMPARISON: None.   ACCESSION NUMBER(S): [id number]   ORDERING CLINICIAN: THANG BORRERO   TECHNIQUE: CT of the abdomen and pelvis was performed.  Standard contiguous axial images were obtained at 3 mm slice thickness through the abdomen and pelvis. Coronal and sagittal reconstructions at 3 mm slice thickness were performed.   75 ml of contrast Omnipaque were administered intravenously without immediate complication.   FINDINGS: LOWER CHEST: Within normal limits   ABDOMEN:   LIVER: Within normal limits.   BILE DUCTS: Normal caliber.   GALLBLADDER: No calcified stones. No wall thickening.   PANCREAS: Within normal limits.   SPLEEN: Within normal limits.   ADRENAL GLANDS: Within normal limits.   KIDNEYS AND URETERS: There is a 5 mm calculus noted within the distal left ureter in the vicinity of the UVJ with associated mild left-sided hydroureteronephrosis. The right kidney is unremarkable.   PELVIS:   BLADDER: Within normal limits.   REPRODUCTIVE ORGANS: The uterus is present.   BOWEL: The stomach, small and large bowel are normal in appearance without wall thickening or obstruction.   Normal appendix.   VESSELS: The aorta and IVC appear normal.   PERITONEUM/RETROPERITONEUM/LYMPH NODES: No ascites or free air, no fluid collection.  No enlarged mesenteric lymph nodes.   BONES AND ABDOMINAL WALL: No suspicious osseous lesions are identified.  The abdominal wall soft tissues appear  normal.       1.  There is a 5 mm calculus noted within the distal left ureter in the vicinity of the UVJ with associated mild left-sided hydroureteronephrosis.     MACRO: None   Signed by: Dung Caballero 9/24/2024 9:16 PM Dictation workstation:   [id number]        Assessment/Plan     Emy Renae is a 25 yo female who presented with left side flank pain, who is now admitted with left kidney stone. Overall pt is feeling much better, pain is controlled with medications. Her abdomen is soft, non tender, non distended, + mild left flank soreness.     Plan: Kidney stone  - increase PO hydration, try to avoid sodas  - continue flomax  - toradol as needed for severe pain  - tylenol as needed  - strain urine   - supportive care per primary team    Dispo: ok to be discharged when medically cleared. Pt to follow up with Dr Pete next week in the clinic. Discussed with Dr Pete and Dr Chacko    I spent 35 minutes in the professional and overall care of this patient.      Poonam Dai, APRN-CNP

## 2024-09-25 NOTE — ASSESSMENT & PLAN NOTE
- IVF  - Dilaudid for pain, morphine provided little relief.   - flomax  - Zofran for N/V  - strain urine  - Urology consult  - NPO     Gestational HTN  - prescribed labetalol 100mg BID, hold parameters placed, pt bradycardic      Post partum state  - pt not breast feeding

## 2024-09-25 NOTE — DISCHARGE SUMMARY
Discharge Diagnosis  Nephrolithiasis    Issues Requiring Follow-Up  PCP and urology follow up     Discharge Meds     Medication List      START taking these medications     cefdinir 300 mg capsule; Commonly known as: Omnicef; Take 1 capsule (300   mg) by mouth 2 times a day for 5 days.   ketorolac 10 mg tablet; Commonly known as: Toradol; Take 1 tablet (10   mg) by mouth every 8 hours if needed for moderate pain (4 - 6) or severe   pain (7 - 10).   tamsulosin 0.4 mg 24 hr capsule; Commonly known as: Flomax; Take 1   capsule (0.4 mg) by mouth once daily.; Start taking on: September 26, 2024     CONTINUE taking these medications     blood pressure test kit-large kit; Use to check blood pressure 2 times a   day.   labetalol 100 mg tablet; Commonly known as: Normodyne; Take 1 tablet   (100 mg) by mouth 2 times a day.   Pain Relief ES (acetaminophen) 500 mg tablet; Generic drug:   acetaminophen; Take 2 tablets (1,000 mg) by mouth every 6 hours if needed   for mild pain (1 - 3).     STOP taking these medications     ibuprofen 600 mg tablet       Test Results Pending At Discharge  Pending Labs       No current pending labs.            Hospital Course     - IVF  - Dilaudid for pain, morphine provided little relief.   - flomax  - Zofran for N/V  - strain urine  - Urology consult  -Patient evaluated by urology team and recommended discharge home on Flomax and pain medications.  Patient will follow-up with urology in 1 week.     Gestational HTN  - prescribed labetalol 100mg BID      Patient will be discharged in stable condition.  Advised her to follow with her PCP and urology as outpatient in 1 week.       Pertinent Physical Exam At Time of Discharge  Physical Exam    Constitutional: No acute distress, awake, alert  Head/Neck: Neck supple  Respiratory/Thorax: Lungs are Clear to auscultation  Cardiovascular: Regular, rate and rhythm,  2+ equal pulses of the extremities, normal S 1and S 2  Gastrointestinal: Nondistended, soft,  non-tender, no rebound tenderness or guarding  Extremities: No edema. No calf tenderness.  Neurological: Awake and alert. No focal neurological deficits  Psychological: Appropriate mood and behavior    Outpatient Follow-Up  Future Appointments   Date Time Provider Department Center   10/1/2024 11:00 AM Gemma Petersen MD [id number] [place]         Tracy Chacko MD

## 2024-09-25 NOTE — PROGRESS NOTES
09/25/24 0958   Discharge Planning   Living Arrangements Spouse/significant other   Support Systems Spouse/significant other;Children   Assistance Needed none   Type of Residence Private residence   Number of Stairs to Enter Residence 12   Number of Stairs Within Residence 0   Do you have animals or pets at home? No   Who is requesting discharge planning? Provider   Home or Post Acute Services None   Expected Discharge Disposition Home   Does the patient need discharge transport arranged? No   Financial Resource Strain   How hard is it for you to pay for the very basics like food, housing, medical care, and heating? Not hard   Transportation Needs   In the past 12 months, has lack of transportation kept you from medical appointments or from getting medications? no   In the past 12 months, has lack of transportation kept you from meetings, work, or from getting things needed for daily living? No     I met with patient at bedside and explained tcc role. She lives in an apartment with her boyfriend and children. Had a baby 3 weeks ago.  Admitted with kidney stone,  pending urology consult. Denies any hhc needs at this time.

## 2024-09-30 RX ORDER — SERTRALINE HYDROCHLORIDE 25 MG/1
25 TABLET, FILM COATED ORAL DAILY
Qty: 50 TABLET | Refills: 0 | Status: SHIPPED | OUTPATIENT
Start: 2024-09-30 | End: 2025-03-29

## 2024-10-01 ENCOUNTER — APPOINTMENT (OUTPATIENT)
Dept: OBSTETRICS AND GYNECOLOGY | Facility: CLINIC | Age: 24
End: 2024-10-01
Payer: COMMERCIAL

## 2024-10-01 VITALS
SYSTOLIC BLOOD PRESSURE: 116 MMHG | DIASTOLIC BLOOD PRESSURE: 74 MMHG | HEIGHT: 65 IN | WEIGHT: 191 LBS | BODY MASS INDEX: 31.82 KG/M2

## 2024-10-01 DIAGNOSIS — Z01.30 BP CHECK: ICD-10-CM

## 2024-10-01 PROCEDURE — 0503F POSTPARTUM CARE VISIT: CPT | Performed by: OBSTETRICS & GYNECOLOGY

## 2024-10-01 ASSESSMENT — EDINBURGH POSTNATAL DEPRESSION SCALE (EPDS)
I HAVE FELT SCARED OR PANICKY FOR NO GOOD REASON: YES, SOMETIMES
THINGS HAVE BEEN GETTING ON TOP OF ME: YES, SOMETIMES I HAVEN'T BEEN COPING AS WELL AS USUAL
I HAVE BEEN SO UNHAPPY THAT I HAVE BEEN CRYING: NO, NEVER
I HAVE BEEN ABLE TO LAUGH AND SEE THE FUNNY SIDE OF THINGS: NOT QUITE SO MUCH NOW
I HAVE BEEN ANXIOUS OR WORRIED FOR NO GOOD REASON: YES, VERY OFTEN
TOTAL SCORE: 15
I HAVE BEEN SO UNHAPPY THAT I HAVE HAD DIFFICULTY SLEEPING: YES, SOMETIMES
THE THOUGHT OF HARMING MYSELF HAS OCCURRED TO ME: NEVER
I HAVE LOOKED FORWARD WITH ENJOYMENT TO THINGS: AS MUCH AS I EVER DID
I HAVE BLAMED MYSELF UNNECESSARILY WHEN THINGS WENT WRONG: YES, SOME OF THE TIME
I HAVE FELT SAD OR MISERABLE: YES, MOST OF THE TIME

## 2024-10-01 ASSESSMENT — PAIN SCALES - GENERAL: PAINLEVEL: 0-NO PAIN

## 2024-10-01 NOTE — PROGRESS NOTES
Pt here for BP check  Medication changed from nifedipine to labetalol at last visit  Patient no longer having headaches or feeling flushed  Blood pressures have been normal    Patient has not started Zoloft yet  Is waiting for the pharmacy to have the medication available    Passed kidney stone last week  Feeling much better    Started bleeding a couple days ago, similar to a period  Patient is not breast-feeding and did not not try breast-feed  Had Nexplanon placed prior to being discharged from the hospital after delivery    A/P: Blood pressure stable with labetalol.  Follow-up in 2 and half weeks for postpartum visit  Reviewed use of Zoloft

## 2024-10-15 ENCOUNTER — APPOINTMENT (OUTPATIENT)
Dept: OBSTETRICS AND GYNECOLOGY | Facility: CLINIC | Age: 24
End: 2024-10-15

## 2024-10-28 RX ORDER — SERTRALINE HYDROCHLORIDE 50 MG/1
50 TABLET, FILM COATED ORAL DAILY
Qty: 90 TABLET | Refills: 3 | Status: SHIPPED | OUTPATIENT
Start: 2024-10-28 | End: 2025-04-26

## 2024-11-26 ENCOUNTER — APPOINTMENT (OUTPATIENT)
Dept: OBSTETRICS AND GYNECOLOGY | Facility: CLINIC | Age: 24
End: 2024-11-26

## 2024-12-03 ENCOUNTER — APPOINTMENT (OUTPATIENT)
Dept: OBSTETRICS AND GYNECOLOGY | Facility: CLINIC | Age: 24
End: 2024-12-03

## (undated) DEVICE — SUTURE, CHROMIC GUT, 2-0, SH 27IN

## (undated) DEVICE — DRESSING, MEPILEX BORDER, POST-OP AG, 4 X 10 IN

## (undated) DEVICE — SUTURE, STRATAFIX PDS PLUS, 1, OS-6, SYMMETRIC 45CM, VIOLET

## (undated) DEVICE — DRAPE PACK, CESAREAN SECTION, CUSTOM, UHC

## (undated) DEVICE — GOWN, SURGICAL, SMARTGOWN, XLARGE, STERILE

## (undated) DEVICE — SUTURE, STRATAFIX, SYMMETRIC PDS PLUS, SIZE 1, 45CM, CT-1 36MM VIOLET

## (undated) DEVICE — SUTURE, STRATAFIX, SPIRAL MONOCRYL PLUS, 3-0, PS-2 45CM, UNDYED

## (undated) DEVICE — COVER HANDLE LIGHT, STERIS, BLUE, STERILE

## (undated) DEVICE — Device

## (undated) DEVICE — SOLUTION, IRRIGATION, SODIUM CHLORIDE 0.9%, 1000 ML, POUR BOTTLE

## (undated) DEVICE — GLOVE, SURGICAL, PROTEXIS PI , 6.5, PF, LF

## (undated) DEVICE — SUTURE, STRATAFIX, 2-0, SPIRAL MONOCRYL PLUS, 45CM, CT-2 UNDYED

## (undated) DEVICE — CATHETER TRAY, SURESTEP, 16FR, URINE METER W/STATLOCK

## (undated) DEVICE — STRAP, VELCRO, BODY, 4 X 60IN, NS